# Patient Record
Sex: MALE | Race: OTHER | HISPANIC OR LATINO | URBAN - METROPOLITAN AREA
[De-identification: names, ages, dates, MRNs, and addresses within clinical notes are randomized per-mention and may not be internally consistent; named-entity substitution may affect disease eponyms.]

---

## 2019-01-10 ENCOUNTER — EMERGENCY (EMERGENCY)
Facility: HOSPITAL | Age: 34
LOS: 1 days | Discharge: HOME | End: 2019-01-10
Attending: EMERGENCY MEDICINE
Payer: MEDICAID

## 2019-01-10 VITALS
HEIGHT: 64 IN | WEIGHT: 179.9 LBS | HEART RATE: 81 BPM | OXYGEN SATURATION: 98 % | DIASTOLIC BLOOD PRESSURE: 88 MMHG | TEMPERATURE: 98 F | SYSTOLIC BLOOD PRESSURE: 165 MMHG | RESPIRATION RATE: 19 BRPM

## 2019-01-10 VITALS
OXYGEN SATURATION: 100 % | HEART RATE: 66 BPM | DIASTOLIC BLOOD PRESSURE: 77 MMHG | RESPIRATION RATE: 18 BRPM | SYSTOLIC BLOOD PRESSURE: 114 MMHG

## 2019-01-10 LAB
APPEARANCE UR: CLEAR — SIGNIFICANT CHANGE UP
BACTERIA # UR AUTO: NEGATIVE — SIGNIFICANT CHANGE UP
BILIRUB UR-MCNC: NEGATIVE — SIGNIFICANT CHANGE UP
COLOR SPEC: SIGNIFICANT CHANGE UP
DIFF PNL FLD: NEGATIVE — SIGNIFICANT CHANGE UP
EPI CELLS # UR: 0 /HPF — SIGNIFICANT CHANGE UP
GLUCOSE UR QL: NEGATIVE — SIGNIFICANT CHANGE UP
HYALINE CASTS # UR AUTO: 0 /LPF — SIGNIFICANT CHANGE UP (ref 0–2)
KETONES UR-MCNC: NEGATIVE — SIGNIFICANT CHANGE UP
LEUKOCYTE ESTERASE UR-ACNC: NEGATIVE — SIGNIFICANT CHANGE UP
NITRITE UR-MCNC: NEGATIVE — SIGNIFICANT CHANGE UP
PH UR: 6 — SIGNIFICANT CHANGE UP (ref 5–8)
PROT UR-MCNC: NEGATIVE — SIGNIFICANT CHANGE UP
RBC CASTS # UR COMP ASSIST: 1 /HPF — SIGNIFICANT CHANGE UP (ref 0–4)
SP GR SPEC: 1.02 — SIGNIFICANT CHANGE UP (ref 1.01–1.02)
UROBILINOGEN FLD QL: NEGATIVE — SIGNIFICANT CHANGE UP
WBC UR QL: 0 /HPF — SIGNIFICANT CHANGE UP (ref 0–5)

## 2019-01-10 PROCEDURE — 93975 VASCULAR STUDY: CPT | Mod: 26

## 2019-01-10 PROCEDURE — 87086 URINE CULTURE/COLONY COUNT: CPT

## 2019-01-10 PROCEDURE — 76870 US EXAM SCROTUM: CPT | Mod: 26

## 2019-01-10 PROCEDURE — 93975 VASCULAR STUDY: CPT

## 2019-01-10 PROCEDURE — 99284 EMERGENCY DEPT VISIT MOD MDM: CPT

## 2019-01-10 PROCEDURE — 99284 EMERGENCY DEPT VISIT MOD MDM: CPT | Mod: 25

## 2019-01-10 PROCEDURE — 81001 URINALYSIS AUTO W/SCOPE: CPT

## 2019-01-10 PROCEDURE — 76870 US EXAM SCROTUM: CPT

## 2019-01-10 RX ORDER — ACETAMINOPHEN 500 MG
975 TABLET ORAL ONCE
Qty: 0 | Refills: 0 | Status: COMPLETED | OUTPATIENT
Start: 2019-01-10 | End: 2019-01-10

## 2019-01-10 RX ADMIN — Medication 975 MILLIGRAM(S): at 21:20

## 2019-01-10 NOTE — ED PROVIDER NOTE - ATTENDING CONTRIBUTION TO CARE
33M, no sig pmh, presetns with L-sided testicular pain, intermittent x 1 year, more consistent over last week. waxing and waning in severity. no sig provoking or alleviating factors. 8/10 today which pompted ed visit. non-radiating. no dysuria, hematuria, urgency. no penile d/c. no hx std. no rash, swelling. sexually active with one partner only. denies cp, sob, abd pain, n/v/d, change in stools. no trauma.    PE: NAD, NCAT, MMM, Trachea midline, Normal conjunctiva, lungs CTAB, S1/S2 RRR, Normal perfusion, 2+ radial pulses bilat, Abdomen Soft, NTND, No rebound/guarding, No LE edema, No deformity of extremities, No rashes,  No focal motor or sensory deficits. Testicular exam (max bailey) - minimal ttp posterior aspect L testicle, normal lie, no edema, no evidence hernia on cough, no discharge from meatus, no rash.    VS without sig abnormality. Ddx includes but not limited to epididymitis, torsion (although low suspicion), UTI. no s/s std. obtain US, urine/culture, give tylenol, re-eval. - Garret Harrell MD

## 2019-01-10 NOTE — ED PROVIDER NOTE - PROGRESS NOTE DETAILS
pt feeling somewhat improved. updated on results. to f/u with urology. return precautions discussed. An opportunity to ask questions was provided and all answered. - Garret Harrell MD

## 2019-01-10 NOTE — ED PROVIDER NOTE - OBJECTIVE STATEMENT
32 yo otherwise healthy male presents to the ED c/o left sided testicular pain x 1 week. Pt reports has had generalized testicular pain for about 1 year now but has not followed up with anyone. Pain was felt every day, intermittent in nature and not worsened with movement or activity. This past wee pt has felt pain has been more left sided and has increased in intensity. Today pain was 8/10 so came to ED. Pain intermittent throughout the day. He is sexually active with 1 partner.  Denies fever/chills, urinary urgency, dysuria, frequency, penile discharge, abdominal pain, n/v/d, recent travel, trauma to the area, Does not know if his vaccinations are up to date. 32 yo otherwise healthy male presents to the ED c/o left sided testicular pain x 1 week. Pt reports has had generalized testicular pain for about 1 year now but has not followed up with anyone. Pain was felt every day, intermittent in nature and not worsened with movement or activity. This past wee pt has felt pain has been more left sided and has increased in intensity. Today pain was 8/10 so came to ED. Pain intermittent throughout the day. He is sexually active with 1 partner.  Denies fever/chills, urinary urgency, dysuria, frequency, hematuria, penile discharge, abdominal pain, n/v/d, recent travel, trauma to the area, chest pain, shortness of breath, dizziness, weakness, flank pain. Does not know if his vaccinations are up to date.

## 2019-01-10 NOTE — ED PROVIDER NOTE - NSFOLLOWUPCLINICS_GEN_ALL_ED_FT
[facility] - Urology  Urology  [address], [address] [place] [address]  Phone: [phone]  Fax:   Follow Up Time:

## 2019-01-10 NOTE — ED ADULT NURSE NOTE - OBJECTIVE STATEMENT
32y/o male walked into ED a&ox3 c/o testicular pain. Patient reports pain for the past year, intermittent. States he saw a doctor about it when it first began but has not seen anyone since. Reports pain has been worse than usual today and decided he would come get checked out. Reports pain is worse in left testicle but sometimes feels pain in both. Nothing makes pain better or worse, unable to describe pain. Denies Abdominal pain, urinary frequency/burning/hesitancy, blood in urine, back pain, discharge from penis, swelling to testicles, or any other symptoms. Lungs clear b/l. Abd soft, nontender, nondistended. No swelling, redness or drainage noted upon assessment. No tenderness. INGRID Renee at bedside for eval.

## 2019-01-10 NOTE — ED PROVIDER NOTE - GENITOURINARY TESTICULAR EXAM, RIGHT
Normal lie, no tenderness, no mass/selling. No epididymal tenderness.  Cremasteric reflex is present

## 2019-01-10 NOTE — ED ADULT NURSE NOTE - NSIMPLEMENTINTERV_GEN_ALL_ED
Implemented All Universal Safety Interventions:  [place] to call system. Call bell, personal items and telephone within reach. Instruct patient to call for assistance. Room bathroom lighting operational. Non-slip footwear when patient is off stretcher. Physically safe environment: no spills, clutter or unnecessary equipment. Stretcher in lowest position, wheels locked, appropriate side rails in place.

## 2019-01-10 NOTE — ED PROVIDER NOTE - NSFOLLOWUPINSTRUCTIONS_ED_ALL_ED_FT
You were seen for testicular pain. Your ultrasound showed hydroceles but no other significant findings. Please follow-up with urology at number provided, call for appointment. Take Tylenol and/or Motrin as directed for pain. Return immediately for any worsening or concerning symptoms including but not limited to difficulty urinating, painful urination, worsening testicular pain, swelling or discoloration of testicles, abdominal pain, or anything else that concerns you.

## 2019-01-10 NOTE — ED PROVIDER NOTE - NONTENDER LOCATION
Nontender all abdominal quadrants with no rebound, guarding or rigidity noted. Negative Rovsing's, negative Sequeira's. No McBurney's point tenderness.

## 2019-01-11 LAB
CULTURE RESULTS: NO GROWTH — SIGNIFICANT CHANGE UP
SPECIMEN SOURCE: SIGNIFICANT CHANGE UP

## 2019-01-18 ENCOUNTER — OUTPATIENT (OUTPATIENT)
Dept: OUTPATIENT SERVICES | Facility: HOSPITAL | Age: 34
LOS: 1 days | End: 2019-01-18
Payer: SELF-PAY

## 2019-01-18 ENCOUNTER — APPOINTMENT (OUTPATIENT)
Dept: UROLOGY | Facility: CLINIC | Age: 34
End: 2019-01-18

## 2019-01-18 DIAGNOSIS — R35.0 FREQUENCY OF MICTURITION: ICD-10-CM

## 2019-01-18 PROCEDURE — G0463: CPT

## 2019-01-18 NOTE — HISTORY OF PRESENT ILLNESS
[FreeTextEntry1] : Patient is a 33yoM who presents with L. sided scrotal pain.  He presented to the [facility] ER on 1/10/19 complaining of left-sided testicular pain for ~one week duration.  Here he underwent ultrasound showing b/l hydroceles with right (small-moderate) greater than left (small), normal blood flow.  Pain has been ongoing for the last year.  He describes it as dull and has been progressively getting worse.  \par \par He has no issues urinating, he denies frequency, hematuria, dysuria.  Denies n/v, f/c, weight change, no constipation/diarrhea.  \par \par PMH: none\par PSH: none\par Allergies: none\par Medications: Vitamin C/D\par Family history: Unsure\par Socail: quit smoking 5 years ago, (10/day), social etoh use\par Sexually active, with wife. \par

## 2019-01-18 NOTE — REVIEW OF SYSTEMS
[Testicular Pain] : testicular pain [Negative] : ENT [Fever] : no fever [Chills] : no chills [Eye Pain] : no eye pain [Chest Pain] : no chest pain [Palpitations] : no palpitations [Shortness Of Breath] : no shortness of breath [Abdominal Pain] : no abdominal pain [Vomiting] : no vomiting [Constipation] : no constipation [Diarrhea] : no diarrhea [Dysuria] : no dysuria [Incontinence] : no incontinence [Hesitancy] : no urinary hesitancy [Genital Lesion] : no genital lesions [Joint Swelling] : no joint swelling [Skin Lesions] : no skin lesions [Muscle Weakness] : no muscle weakness [Swollen Glands In The Neck] : no swollen glands in the neck

## 2019-01-18 NOTE — PHYSICAL EXAM
[General Appearance - Well Developed] : well developed [Well Groomed] : well groomed [General Appearance - In No Acute Distress] : no acute distress [Edema] : no peripheral edema [] : no respiratory distress [Abdomen Soft] : soft [Abdomen Tenderness] : non-tender [Costovertebral Angle Tenderness] : no ~M costovertebral angle tenderness [Urethral Meatus] : meatus normal [Penis Abnormality] : normal uncircumcised penis [Testes Tenderness] : no tenderness of the testes [Normal Station and Gait] : the gait and station were normal for the patient's age [No Focal Deficits] : no focal deficits [Sensation] : the sensory exam was normal to light touch and pinprick [Motor Exam] : the motor exam was normal [Oriented To Time, Place, And Person] : oriented to person, place, and time [No Palpable Adenopathy] : no palpable adenopathy [FreeTextEntry1] : Mild b/l hydroceles (R>L)

## 2019-01-18 NOTE — ASSESSMENT
[FreeTextEntry1] : Patient is a 33yoM with no PMH who presents with testicular pain, [facility] ED on 1/10, underwent u/s showing BL hydroceles (small-moderate on right and small on left). \par \par - Patient with small b/l hydroceles, no large fluid collection palpated.  \par - Recommend Motrin for pain/swelling control\par - Continue to monitor, if progression of size/pain, instructed to re-contact for possible hydrocelectomy.  \par

## 2019-01-22 DIAGNOSIS — N43.3 HYDROCELE, UNSPECIFIED: ICD-10-CM

## 2019-01-23 LAB — BACTERIA UR CULT: NORMAL

## 2019-08-02 ENCOUNTER — LABORATORY RESULT (OUTPATIENT)
Age: 34
End: 2019-08-02

## 2019-08-02 ENCOUNTER — APPOINTMENT (OUTPATIENT)
Dept: INTERNAL MEDICINE | Facility: CLINIC | Age: 34
End: 2019-08-02

## 2019-08-02 ENCOUNTER — OUTPATIENT (OUTPATIENT)
Dept: OUTPATIENT SERVICES | Facility: HOSPITAL | Age: 34
LOS: 1 days | End: 2019-08-02

## 2019-08-02 VITALS
OXYGEN SATURATION: 98 % | BODY MASS INDEX: 27 KG/M2 | HEIGHT: 66.14 IN | HEART RATE: 62 BPM | WEIGHT: 168 LBS | SYSTOLIC BLOOD PRESSURE: 110 MMHG | DIASTOLIC BLOOD PRESSURE: 80 MMHG

## 2019-08-02 DIAGNOSIS — Z78.9 OTHER SPECIFIED HEALTH STATUS: ICD-10-CM

## 2019-08-02 DIAGNOSIS — Z87.891 PERSONAL HISTORY OF NICOTINE DEPENDENCE: ICD-10-CM

## 2019-08-02 DIAGNOSIS — N43.3 HYDROCELE, UNSPECIFIED: ICD-10-CM

## 2019-08-02 DIAGNOSIS — F17.210 NICOTINE DEPENDENCE, CIGARETTES, UNCOMPLICATED: ICD-10-CM

## 2019-08-02 LAB
ALBUMIN SERPL ELPH-MCNC: 4.9 G/DL — SIGNIFICANT CHANGE UP (ref 3.3–5)
ALP SERPL-CCNC: 68 U/L — SIGNIFICANT CHANGE UP (ref 40–120)
ALT FLD-CCNC: 54 U/L — HIGH (ref 4–41)
ANION GAP SERPL CALC-SCNC: 13 MMO/L — SIGNIFICANT CHANGE UP (ref 7–14)
AST SERPL-CCNC: 35 U/L — SIGNIFICANT CHANGE UP (ref 4–40)
BASOPHILS # BLD AUTO: 0.05 K/UL — SIGNIFICANT CHANGE UP (ref 0–0.2)
BASOPHILS NFR BLD AUTO: 0.7 % — SIGNIFICANT CHANGE UP (ref 0–2)
BILIRUB SERPL-MCNC: 0.3 MG/DL — SIGNIFICANT CHANGE UP (ref 0.2–1.2)
BUN SERPL-MCNC: 12 MG/DL — SIGNIFICANT CHANGE UP (ref 7–23)
CALCIUM SERPL-MCNC: 9.9 MG/DL — SIGNIFICANT CHANGE UP (ref 8.4–10.5)
CHLORIDE SERPL-SCNC: 102 MMOL/L — SIGNIFICANT CHANGE UP (ref 98–107)
CHOLEST SERPL-MCNC: 221 MG/DL — HIGH (ref 120–199)
CO2 SERPL-SCNC: 27 MMOL/L — SIGNIFICANT CHANGE UP (ref 22–31)
CREAT SERPL-MCNC: 0.74 MG/DL — SIGNIFICANT CHANGE UP (ref 0.5–1.3)
EOSINOPHIL # BLD AUTO: 0.24 K/UL — SIGNIFICANT CHANGE UP (ref 0–0.5)
EOSINOPHIL NFR BLD AUTO: 3.3 % — SIGNIFICANT CHANGE UP (ref 0–6)
GLUCOSE SERPL-MCNC: 93 MG/DL — SIGNIFICANT CHANGE UP (ref 70–99)
HBA1C BLD-MCNC: 5.5 % — SIGNIFICANT CHANGE UP (ref 4–5.6)
HCT VFR BLD CALC: 47.1 % — SIGNIFICANT CHANGE UP (ref 39–50)
HDLC SERPL-MCNC: 28 MG/DL — LOW (ref 35–55)
HGB BLD-MCNC: 15.4 G/DL — SIGNIFICANT CHANGE UP (ref 13–17)
IMM GRANULOCYTES NFR BLD AUTO: 0.3 % — SIGNIFICANT CHANGE UP (ref 0–1.5)
LIPID PNL WITH DIRECT LDL SERPL: 172 MG/DL — SIGNIFICANT CHANGE UP
LYMPHOCYTES # BLD AUTO: 3.38 K/UL — HIGH (ref 1–3.3)
LYMPHOCYTES # BLD AUTO: 46.4 % — HIGH (ref 13–44)
MCHC RBC-ENTMCNC: 29.8 PG — SIGNIFICANT CHANGE UP (ref 27–34)
MCHC RBC-ENTMCNC: 32.7 % — SIGNIFICANT CHANGE UP (ref 32–36)
MCV RBC AUTO: 91.1 FL — SIGNIFICANT CHANGE UP (ref 80–100)
MONOCYTES # BLD AUTO: 0.6 K/UL — SIGNIFICANT CHANGE UP (ref 0–0.9)
MONOCYTES NFR BLD AUTO: 8.2 % — SIGNIFICANT CHANGE UP (ref 2–14)
NEUTROPHILS # BLD AUTO: 2.99 K/UL — SIGNIFICANT CHANGE UP (ref 1.8–7.4)
NEUTROPHILS NFR BLD AUTO: 41.1 % — LOW (ref 43–77)
NRBC # FLD: 0 K/UL — SIGNIFICANT CHANGE UP (ref 0–0)
PLATELET # BLD AUTO: 329 K/UL — SIGNIFICANT CHANGE UP (ref 150–400)
PMV BLD: 10 FL — SIGNIFICANT CHANGE UP (ref 7–13)
POTASSIUM SERPL-MCNC: 4.5 MMOL/L — SIGNIFICANT CHANGE UP (ref 3.5–5.3)
POTASSIUM SERPL-SCNC: 4.5 MMOL/L — SIGNIFICANT CHANGE UP (ref 3.5–5.3)
PROT SERPL-MCNC: 7.9 G/DL — SIGNIFICANT CHANGE UP (ref 6–8.3)
RBC # BLD: 5.17 M/UL — SIGNIFICANT CHANGE UP (ref 4.2–5.8)
RBC # FLD: 12.3 % — SIGNIFICANT CHANGE UP (ref 10.3–14.5)
SODIUM SERPL-SCNC: 142 MMOL/L — SIGNIFICANT CHANGE UP (ref 135–145)
TRIGL SERPL-MCNC: 182 MG/DL — HIGH (ref 10–149)
WBC # BLD: 7.28 K/UL — SIGNIFICANT CHANGE UP (ref 3.8–10.5)
WBC # FLD AUTO: 7.28 K/UL — SIGNIFICANT CHANGE UP (ref 3.8–10.5)

## 2019-08-02 NOTE — HEALTH RISK ASSESSMENT
[Yes] : Yes [] : Yes [2 - 3 times a week (3 pts)] : 2 - 3  times a week (3 points) [Never (0 pts)] : Never (0 points) [3 or 4 (1 pt)] : 3 or 4  (1 point) [No] : In the past 12 months have you used drugs other than those required for medical reasons? No [No falls in past year] : Patient reported no falls in the past year [0] : 2) Feeling down, depressed, or hopeless: Not at all (0) [Cultural] : cultural [With Family] : lives with family [Financial] : financial [# of Members in Household ___] :  household currently consist of [unfilled] member(s) [Employed] : employed [Sexually Active] : sexually active [Fully functional (bathing, dressing, toileting, transferring, walking, feeding)] : Fully functional (bathing, dressing, toileting, transferring, walking, feeding) [Feels Safe at Home] : Feels safe at home [Fully functional (using the telephone, shopping, preparing meals, housekeeping, doing laundry, using] : Fully functional and needs no help or supervision to perform IADLs (using the telephone, shopping, preparing meals, housekeeping, doing laundry, using transportation, managing medications and managing finances) [Health Literacy] : health literacy [# Of Children ___] : has [unfilled] children [] :  [HIV Test offered] : HIV Test offered [Hepatitis C test offered] : Hepatitis C test offered [FreeTextEntry1] : Testicular pain  [de-identified] : Seen by urology 1/2019 [de-identified] : Occasionally. Sometimes will smoke 1-2 cigarettes once per week. Previously smoked 10 cigarettes/day ~3 years ago. [de-identified] : Occasional [Audit-CScore] : 4 [de-identified] : Sometimes plays soccer three times a week [de-identified] : Regular diet that includes rice, tortillas, arepas, empanadas, bread, chicken, steak, fish. Very rarely eats vegetables, salads, or fruits.  [[id number]] : 0 [High Risk Behavior] : no high risk behavior [Reports changes in vision] : Reports no changes in vision [Reports changes in hearing] : Reports no changes in hearing [Reports changes in dental health] : Reports no changes in dental health [de-identified] : Language [FreeTextEntry2] : Construction

## 2019-08-02 NOTE — REVIEW OF SYSTEMS
[Fever] : no fever [Chills] : no chills [Fatigue] : no fatigue [Night Sweats] : no night sweats [Recent Change In Weight] : ~T no recent weight change [Vision Problems] : no vision problems [Chest Pain] : no chest pain [Lower Ext Edema] : no lower extremity edema [Shortness Of Breath] : no shortness of breath [Wheezing] : no wheezing [Cough] : no cough [Dyspnea on Exertion] : not dyspnea on exertion [Abdominal Pain] : no abdominal pain [Nausea] : no nausea [Constipation] : no constipation [Diarrhea] : no diarrhea [Vomiting] : no vomiting [Heartburn] : no heartburn [Melena] : no melena [Incontinence] : no incontinence [Hematuria] : no hematuria [Skin Rash] : no skin rash [Frequency] : no frequency [Depression] : no depression [FreeTextEntry8] : Dysuria. Bilateral testicular pain

## 2019-08-02 NOTE — HISTORY OF PRESENT ILLNESS
[FreeTextEntry1] : Establish care [de-identified] : This patient is primarily Hebrew speaking and this author is a native speaker. \par \par 33 y.o Garcia BENITEZ with bilateral hydrocele presenting today to establish care. Last seen by a PCP 1-2 years ago. States feeling well today. Continues with daily, intermittent testicular pain x >1 year. Describes the pain as soreness and dullness and worsening since last seen by urology 8 months ago. Attempted taking Motrin x 4-5 days initially in January with no relief. Admits to dysuria one or twice in the last 2 months.  Denies trauma, testicular swelling/increase in size, blood in urine, urinary frequency, flank pain, new sexual partners, fever, chills, nausea, vomiting, diarrhea, unintentional weight loss, or recent travel.\par \par Of note, pt's wife recently underwent bilateral mastectomy for breast ca. Wife recovering well and discharged today. To pick her-up after today's visit. Though expresses undergoing a difficult time, denies depression.

## 2019-08-02 NOTE — PHYSICAL EXAM
[No Acute Distress] : no acute distress [Well Nourished] : well nourished [Well Developed] : well developed [No JVD] : no jugular venous distention [Well-Appearing] : well-appearing [Supple] : supple [No Lymphadenopathy] : no lymphadenopathy [No Respiratory Distress] : no respiratory distress  [Thyroid Normal, No Nodules] : the thyroid was normal and there were no nodules present [No Accessory Muscle Use] : no accessory muscle use [Clear to Auscultation] : lungs were clear to auscultation bilaterally [Regular Rhythm] : with a regular rhythm [Normal Rate] : normal rate  [Pedal Pulses Present] : the pedal pulses are present [No Murmur] : no murmur heard [Normal S1, S2] : normal S1 and S2 [No Edema] : there was no peripheral edema [Penis Abnormality] : normal uncircumcised penis [Testes Tenderness] : no tenderness of the testes [No CVA Tenderness] : no CVA  tenderness [Testes Mass (___cm)] : there were no testicular masses [No Rash] : no rash [Coordination Grossly Intact] : coordination grossly intact [Normal Gait] : normal gait [No Focal Deficits] : no focal deficits [Alert and Oriented x3] : oriented to person, place, and time [Normal Affect] : the affect was normal [Normal Insight/Judgement] : insight and judgment were intact [de-identified] : Chaperone encouraged for  exam but pt declines. bilateral hydroceles noted.

## 2019-08-03 LAB
HIV 1+2 AB+HIV1 P24 AG SERPL QL IA: SIGNIFICANT CHANGE UP
MEV IGG SER-ACNC: >300 AU/ML — SIGNIFICANT CHANGE UP
MEV IGG+IGM SER-IMP: POSITIVE — SIGNIFICANT CHANGE UP
MUV AB SER-ACNC: POSITIVE — SIGNIFICANT CHANGE UP
MUV IGG FLD-ACNC: 110 AU/ML — SIGNIFICANT CHANGE UP
RUBV IGG SER-ACNC: 30.6 INDEX — SIGNIFICANT CHANGE UP
RUBV IGG SER-IMP: POSITIVE — SIGNIFICANT CHANGE UP
T PALLIDUM AB TITR SER: NEGATIVE — SIGNIFICANT CHANGE UP

## 2019-08-04 LAB
HBV CORE AB SER-ACNC: NONREACTIVE — SIGNIFICANT CHANGE UP
HBV SURFACE AB SER-ACNC: NONREACTIVE — SIGNIFICANT CHANGE UP
HBV SURFACE AG SER-ACNC: NONREACTIVE — SIGNIFICANT CHANGE UP
HCV AB S/CO SERPL IA: 0.08 S/CO — SIGNIFICANT CHANGE UP (ref 0–0.99)
HCV AB SERPL-IMP: SIGNIFICANT CHANGE UP

## 2019-08-05 LAB
C TRACH RRNA SPEC QL NAA+PROBE: SIGNIFICANT CHANGE UP
N GONORRHOEA RRNA SPEC QL NAA+PROBE: SIGNIFICANT CHANGE UP
SPECIMEN SOURCE: SIGNIFICANT CHANGE UP

## 2019-10-04 ENCOUNTER — OUTPATIENT (OUTPATIENT)
Dept: OUTPATIENT SERVICES | Facility: HOSPITAL | Age: 34
LOS: 1 days | End: 2019-10-04
Payer: SELF-PAY

## 2019-10-04 ENCOUNTER — APPOINTMENT (OUTPATIENT)
Dept: UROLOGY | Facility: CLINIC | Age: 34
End: 2019-10-04

## 2019-10-04 DIAGNOSIS — R35.0 FREQUENCY OF MICTURITION: ICD-10-CM

## 2019-10-04 PROCEDURE — G0463: CPT

## 2019-10-04 NOTE — PHYSICAL EXAM
[Well Groomed] : well groomed [General Appearance - Well Developed] : well developed [General Appearance - In No Acute Distress] : no acute distress [Abdomen Soft] : soft [Abdomen Tenderness] : non-tender [Costovertebral Angle Tenderness] : no ~M costovertebral angle tenderness [Urethral Meatus] : meatus normal [Penis Abnormality] : normal uncircumcised penis [Testes Tenderness] : no tenderness of the testes [Edema] : no peripheral edema [] : no respiratory distress [Oriented To Time, Place, And Person] : oriented to person, place, and time [Normal Station and Gait] : the gait and station were normal for the patient's age [No Focal Deficits] : no focal deficits [Motor Exam] : the motor exam was normal [Sensation] : the sensory exam was normal to light touch and pinprick [Scrotum] : the scrotum was normal [Skin Color & Pigmentation] : normal skin color and pigmentation [FreeTextEntry1] : No palpable hydroceles.  No testicular tenderness.  Scrotum appears normal.  Testicular ultrasound performed with BK ultrasound probe showing no residual hydroceles, normal testicular size and volume

## 2019-10-04 NOTE — ASSESSMENT
[FreeTextEntry1] : 33 y/o M with no PMH who presents with testicular pain.  Had previous bilateral hydroceles.  No palpable or visible  hydroceles.  No scrotal or testicular tenderness currently.\par \par - Formal testicular sono, will call with results\par - Recommend tylenol and motrin as needed for pain control\par - Elevated scrotum\par - RTC as needed\par

## 2019-10-04 NOTE — REVIEW OF SYSTEMS
[Testicular Pain] : testicular pain [see HPI] : see HPI [Negative] : Gastrointestinal [Fever] : no fever [Eye Pain] : no eye pain [Chills] : no chills [Chest Pain] : no chest pain [Palpitations] : no palpitations [Shortness Of Breath] : no shortness of breath [Abdominal Pain] : no abdominal pain [Vomiting] : no vomiting [Constipation] : no constipation [Diarrhea] : no diarrhea [Incontinence] : no incontinence [Dysuria] : no dysuria [Genital Lesion] : no genital lesions [Hesitancy] : no urinary hesitancy [Joint Swelling] : no joint swelling [Skin Lesions] : no skin lesions [Muscle Weakness] : no muscle weakness [Swollen Glands In The Neck] : no swollen glands in the neck

## 2019-10-04 NOTE — HISTORY OF PRESENT ILLNESS
[FreeTextEntry1] : 35 y/o M with bilateral hydroceles in January 2019 presents for continued testicular pain.  Pain is intermittent and bilateral.  Pain is as high as a 9/10.  Comes out of no where.  Exacerbated by lifting heavy objects.  No relief with conservative management but has not really tried PO pain control.  \par \par PMH: none\par PSH: none\par Allergies: none\par Medications: Vitamin C/D\par Family history: Unsure\par Socail: quit smoking 5 years ago, (10/day), social etoh use\par Sexually active, with wife. \par  [Urinary Incontinence] : no urinary incontinence [Urinary Retention] : no urinary retention [Urinary Urgency] : no urinary urgency [Urinary Frequency] : no urinary frequency [Dysuria] : no dysuria [Nocturia] : no nocturia [Hematuria - Gross] : no gross hematuria [Fatigue] : no fatigue [Fever] : no fever [Nausea] : no nausea [Anorexia] : no anorexia

## 2019-10-07 DIAGNOSIS — N50.819 TESTICULAR PAIN, UNSPECIFIED: ICD-10-CM

## 2019-10-07 DIAGNOSIS — N43.3 HYDROCELE, UNSPECIFIED: ICD-10-CM

## 2020-02-18 ENCOUNTER — APPOINTMENT (OUTPATIENT)
Dept: INTERNAL MEDICINE | Facility: CLINIC | Age: 35
End: 2020-02-18

## 2020-12-04 ENCOUNTER — LABORATORY RESULT (OUTPATIENT)
Age: 35
End: 2020-12-04

## 2020-12-04 ENCOUNTER — APPOINTMENT (OUTPATIENT)
Dept: INTERNAL MEDICINE | Facility: CLINIC | Age: 35
End: 2020-12-04

## 2020-12-04 ENCOUNTER — OUTPATIENT (OUTPATIENT)
Dept: OUTPATIENT SERVICES | Facility: HOSPITAL | Age: 35
LOS: 1 days | End: 2020-12-04

## 2020-12-04 VITALS
DIASTOLIC BLOOD PRESSURE: 80 MMHG | WEIGHT: 185 LBS | HEART RATE: 68 BPM | SYSTOLIC BLOOD PRESSURE: 115 MMHG | OXYGEN SATURATION: 98 % | BODY MASS INDEX: 29.73 KG/M2 | HEIGHT: 66 IN

## 2020-12-04 DIAGNOSIS — E78.5 HYPERLIPIDEMIA, UNSPECIFIED: ICD-10-CM
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
## 2020-12-04 DIAGNOSIS — Z11.3 ENCOUNTER FOR SCREENING FOR INFECTIONS WITH A PREDOMINANTLY SEXUAL MODE OF TRANSMISSION: ICD-10-CM

## 2020-12-04 DIAGNOSIS — Z00.00 ENCOUNTER FOR GENERAL ADULT MEDICAL EXAMINATION WITHOUT ABNORMAL FINDINGS: ICD-10-CM

## 2020-12-04 DIAGNOSIS — Z23 ENCOUNTER FOR IMMUNIZATION: ICD-10-CM

## 2020-12-04 DIAGNOSIS — N50.819 TESTICULAR PAIN, UNSPECIFIED: ICD-10-CM

## 2020-12-04 DIAGNOSIS — M25.562 PAIN IN LEFT KNEE: ICD-10-CM

## 2020-12-04 DIAGNOSIS — Z87.898 PERSONAL HISTORY OF OTHER SPECIFIED CONDITIONS: ICD-10-CM

## 2020-12-04 DIAGNOSIS — Z00.00 ENCOUNTER FOR GENERAL ADULT MEDICAL EXAMINATION W/OUT ABNORMAL FINDINGS: ICD-10-CM
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
## 2020-12-04 DIAGNOSIS — E66.3 OVERWEIGHT: ICD-10-CM

## 2020-12-04 LAB
ALBUMIN SERPL ELPH-MCNC: 4.9 G/DL — SIGNIFICANT CHANGE UP (ref 3.3–5)
ALP SERPL-CCNC: 69 U/L — SIGNIFICANT CHANGE UP (ref 40–120)
ALT FLD-CCNC: 24 U/L — SIGNIFICANT CHANGE UP (ref 4–41)
ANION GAP SERPL CALC-SCNC: 11 MMO/L — SIGNIFICANT CHANGE UP (ref 7–14)
AST SERPL-CCNC: 19 U/L — SIGNIFICANT CHANGE UP (ref 4–40)
BASOPHILS # BLD AUTO: 0.08 K/UL — SIGNIFICANT CHANGE UP (ref 0–0.2)
BASOPHILS NFR BLD AUTO: 1.1 % — SIGNIFICANT CHANGE UP (ref 0–2)
BILIRUB SERPL-MCNC: 0.3 MG/DL — SIGNIFICANT CHANGE UP (ref 0.2–1.2)
BUN SERPL-MCNC: 13 MG/DL — SIGNIFICANT CHANGE UP (ref 7–23)
CALCIUM SERPL-MCNC: 9.6 MG/DL — SIGNIFICANT CHANGE UP (ref 8.4–10.5)
CHLORIDE SERPL-SCNC: 100 MMOL/L — SIGNIFICANT CHANGE UP (ref 98–107)
CHOLEST SERPL-MCNC: 219 MG/DL — HIGH (ref 120–199)
CO2 SERPL-SCNC: 28 MMOL/L — SIGNIFICANT CHANGE UP (ref 22–31)
CREAT SERPL-MCNC: 0.69 MG/DL — SIGNIFICANT CHANGE UP (ref 0.5–1.3)
DIRECT LDL: 176 MG/DL — SIGNIFICANT CHANGE UP
EOSINOPHIL # BLD AUTO: 0.49 K/UL — SIGNIFICANT CHANGE UP (ref 0–0.5)
EOSINOPHIL NFR BLD AUTO: 6.5 % — HIGH (ref 0–6)
GLUCOSE SERPL-MCNC: 93 MG/DL — SIGNIFICANT CHANGE UP (ref 70–99)
HBA1C BLD-MCNC: 5.7 % — HIGH (ref 4–5.6)
HBV SURFACE AG SER-ACNC: NEGATIVE — SIGNIFICANT CHANGE UP
HCT VFR BLD CALC: 48.1 % — SIGNIFICANT CHANGE UP (ref 39–50)
HDLC SERPL-MCNC: 31 MG/DL — LOW (ref 35–55)
HGB BLD-MCNC: 15.9 G/DL — SIGNIFICANT CHANGE UP (ref 13–17)
IMM GRANULOCYTES NFR BLD AUTO: 0.7 % — SIGNIFICANT CHANGE UP (ref 0–1.5)
LYMPHOCYTES # BLD AUTO: 3.43 K/UL — HIGH (ref 1–3.3)
LYMPHOCYTES # BLD AUTO: 45.2 % — HIGH (ref 13–44)
MCHC RBC-ENTMCNC: 29.8 PG — SIGNIFICANT CHANGE UP (ref 27–34)
MCHC RBC-ENTMCNC: 33.1 % — SIGNIFICANT CHANGE UP (ref 32–36)
MCV RBC AUTO: 90.2 FL — SIGNIFICANT CHANGE UP (ref 80–100)
MONOCYTES # BLD AUTO: 0.6 K/UL — SIGNIFICANT CHANGE UP (ref 0–0.9)
MONOCYTES NFR BLD AUTO: 7.9 % — SIGNIFICANT CHANGE UP (ref 2–14)
NEUTROPHILS # BLD AUTO: 2.94 K/UL — SIGNIFICANT CHANGE UP (ref 1.8–7.4)
NEUTROPHILS NFR BLD AUTO: 38.6 % — LOW (ref 43–77)
NRBC # FLD: 0 K/UL — SIGNIFICANT CHANGE UP (ref 0–0)
PLATELET # BLD AUTO: 384 K/UL — SIGNIFICANT CHANGE UP (ref 150–400)
PMV BLD: 9.9 FL — SIGNIFICANT CHANGE UP (ref 7–13)
POTASSIUM SERPL-MCNC: 4.4 MMOL/L — SIGNIFICANT CHANGE UP (ref 3.5–5.3)
POTASSIUM SERPL-SCNC: 4.4 MMOL/L — SIGNIFICANT CHANGE UP (ref 3.5–5.3)
PROT SERPL-MCNC: 8.1 G/DL — SIGNIFICANT CHANGE UP (ref 6–8.3)
RBC # BLD: 5.33 M/UL — SIGNIFICANT CHANGE UP (ref 4.2–5.8)
RBC # FLD: 12.5 % — SIGNIFICANT CHANGE UP (ref 10.3–14.5)
SODIUM SERPL-SCNC: 139 MMOL/L — SIGNIFICANT CHANGE UP (ref 135–145)
T4 FREE SERPL-MCNC: 1.26 NG/DL — SIGNIFICANT CHANGE UP (ref 0.9–1.8)
TRIGL SERPL-MCNC: 166 MG/DL — HIGH (ref 10–149)
TSH SERPL-MCNC: 1.12 UIU/ML — SIGNIFICANT CHANGE UP (ref 0.27–4.2)
WBC # BLD: 7.59 K/UL — SIGNIFICANT CHANGE UP (ref 3.8–10.5)
WBC # FLD AUTO: 7.59 K/UL — SIGNIFICANT CHANGE UP (ref 3.8–10.5)

## 2020-12-04 RX ORDER — IBUPROFEN 400 MG/1
400 TABLET, FILM COATED ORAL
Qty: 30 | Refills: 0 | Status: DISCONTINUED | COMMUNITY
Start: 2019-08-02 | End: 2020-12-04

## 2020-12-04 NOTE — COUNSELING
[Risk of tobacco use and health benefits of smoking cessation discussed] : Risk of tobacco use and health benefits of smoking cessation discussed [Encouraged to pick a quit date and identify support needed to quit] : Encouraged to pick a quit date and identify support needed to quit

## 2020-12-04 NOTE — PHYSICAL EXAM
[No Acute Distress] : no acute distress [Well Nourished] : well nourished [Well Developed] : well developed [Well-Appearing] : well-appearing [Normal Sclera/Conjunctiva] : normal sclera/conjunctiva [PERRL] : pupils equal round and reactive to light [Normal Outer Ear/Nose] : the outer ears and nose were normal in appearance [Normal Oropharynx] : the oropharynx was normal [No Respiratory Distress] : no respiratory distress  [No Accessory Muscle Use] : no accessory muscle use [Clear to Auscultation] : lungs were clear to auscultation bilaterally [Pedal Pulses Present] : the pedal pulses are present [No Edema] : there was no peripheral edema [Soft] : abdomen soft [Non Tender] : non-tender [Non-distended] : non-distended [No Masses] : no abdominal mass palpated [Normal Bowel Sounds] : normal bowel sounds [Coordination Grossly Intact] : coordination grossly intact [No Focal Deficits] : no focal deficits [Normal Gait] : normal gait [Normal Affect] : the affect was normal [Alert and Oriented x3] : oriented to person, place, and time [Normal Insight/Judgement] : insight and judgment were intact [de-identified] : Bilateral knee symmetric on inspection; left Ron test positive, knee tenderness elicited when valgum stress applied, negative drawer test

## 2020-12-04 NOTE — ASSESSMENT
[FreeTextEntry1] : HCM-\par -Flu vaccine today\par -Tdap last 8/2019 \par -Colorectal ca screen: No personal or family hx of colorectal ca, at average risk. Begin screen age 45\par -STI screen offered, will obtain today. In a monogamous relationship with wife\par -Depression screen negative\par -RTC annually or sooner prn \par \par *Assessment and Plan as noted above*

## 2020-12-04 NOTE — REVIEW OF SYSTEMS
[Recent Change In Weight] : ~T recent weight change [Joint Pain] : joint pain [Joint Swelling] : joint swelling [Fever] : no fever [Chills] : no chills [Night Sweats] : no night sweats [Vision Problems] : no vision problems [Chest Pain] : no chest pain [Palpitations] : no palpitations [Lower Ext Edema] : no lower extremity edema [Shortness Of Breath] : no shortness of breath [Wheezing] : no wheezing [Cough] : no cough [Dyspnea on Exertion] : not dyspnea on exertion [Abdominal Pain] : no abdominal pain [Nausea] : no nausea [Constipation] : no constipation [Diarrhea] : no diarrhea [Vomiting] : no vomiting [Dysuria] : no dysuria [Hematuria] : no hematuria [Headache] : no headache [Dizziness] : no dizziness [Fainting] : no fainting [Depression] : no depression [FreeTextEntry2] : tiredness, weight gain  [FreeTextEntry9] : knee pain

## 2020-12-04 NOTE — HISTORY OF PRESENT ILLNESS
[FreeTextEntry1] : CPE [de-identified] : Pt is primarily Mongolian speaking and this author is a native speaker. Pt declines free interpretation services and would prefer this author to continue in native language.\par \par 35 y.o  M with hx mildly elevated ALT (8/2019), hx bilateral hydroceles (2019), hx testicular pain, overweight, presenting today for an annual physical exam. Pt with c.o left knee pain x 6 months. Describes pain as dull and localized to both sides of knee and below knee. Pain is associated with "cracking" and some swelling. Reports sustaining a fall at work 6 months ago and landing on left knee. Reports feeling a "cracking" sensation with fall. Did not seek care until now. Initially, left knee with swelling but swelling has improved. Has attempted rest and rubbing knee with some improvement. States being unable to do certain movements; has been unable to run, jump, play soccer, bend down. Denies weakness, numbness/tingling, CP, SOB, CROOK, palpitations, lightheadedness, fever, chills, nausea, vomiting, diarrhea, dysuria, gross hematuria, unintentional weight loss, or recent travel.

## 2020-12-04 NOTE — HEALTH RISK ASSESSMENT
[] : Yes [No] : In the past 12 months have you used drugs other than those required for medical reasons? No [One fall no injury in past year] : Patient reported one fall in the past year without injury [0] : 2) Feeling down, depressed, or hopeless: Not at all (0) [With Family] : lives with family [Employed] : employed [] :  [Sexually Active] : sexually active [Fully functional (bathing, dressing, toileting, transferring, walking, feeding)] : Fully functional (bathing, dressing, toileting, transferring, walking, feeding) [Fully functional (using the telephone, shopping, preparing meals, housekeeping, doing laundry, using] : Fully functional and needs no help or supervision to perform IADLs (using the telephone, shopping, preparing meals, housekeeping, doing laundry, using transportation, managing medications and managing finances) [HIV Test offered] : HIV Test offered [Hepatitis C test offered] : Hepatitis C test offered [Cultural] : cultural [Health Literacy] : health literacy [Financial] : financial [Feels Safe at Home] : Feels safe at home [FreeTextEntry1] : Left knee pain  [de-identified] : Occasionally smokes 2 cigarettes a week.  [de-identified] : Previously exercising but d/t left knee pain has not been unable to go running or play soccer [de-identified] : Diet consists of bower/egg/cheese sandwiches, bread, egg, beans, cheese, fast food, juices, Persian foods. 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

## 2020-12-05 LAB
C TRACH RRNA SPEC QL NAA+PROBE: SIGNIFICANT CHANGE UP
HBV CORE AB SER-ACNC: NONREACTIVE — SIGNIFICANT CHANGE UP
HBV SURFACE AB SER-ACNC: NONREACTIVE — SIGNIFICANT CHANGE UP
HCV AB S/CO SERPL IA: 0.07 S/CO — SIGNIFICANT CHANGE UP (ref 0–0.99)
HCV AB SERPL-IMP: SIGNIFICANT CHANGE UP
HIV 1+2 AB+HIV1 P24 AG SERPL QL IA: SIGNIFICANT CHANGE UP
N GONORRHOEA RRNA SPEC QL NAA+PROBE: SIGNIFICANT CHANGE UP
SPECIMEN SOURCE: SIGNIFICANT CHANGE UP
T PALLIDUM AB TITR SER: NEGATIVE — SIGNIFICANT CHANGE UP

## 2020-12-13 ENCOUNTER — RESULT REVIEW (OUTPATIENT)
Age: 35
End: 2020-12-13

## 2020-12-13 ENCOUNTER — APPOINTMENT (OUTPATIENT)
Dept: MRI IMAGING | Facility: CLINIC | Age: 35
End: 2020-12-13

## 2020-12-13 ENCOUNTER — OUTPATIENT (OUTPATIENT)
Dept: OUTPATIENT SERVICES | Facility: HOSPITAL | Age: 35
LOS: 1 days | End: 2020-12-13
Payer: SELF-PAY

## 2020-12-13 DIAGNOSIS — M25.562 PAIN IN LEFT KNEE: ICD-10-CM

## 2020-12-13 PROCEDURE — 73721 MRI JNT OF LWR EXTRE W/O DYE: CPT | Mod: 26,LT

## 2020-12-13 PROCEDURE — 73721 MRI JNT OF LWR EXTRE W/O DYE: CPT

## 2021-01-13 ENCOUNTER — OUTPATIENT (OUTPATIENT)
Dept: OUTPATIENT SERVICES | Facility: HOSPITAL | Age: 36
LOS: 1 days | End: 2021-01-13
Payer: COMMERCIAL

## 2021-01-13 ENCOUNTER — APPOINTMENT (OUTPATIENT)
Dept: ORTHOPEDIC SURGERY | Facility: HOSPITAL | Age: 36
End: 2021-01-13

## 2021-01-13 ENCOUNTER — RESULT REVIEW (OUTPATIENT)
Age: 36
End: 2021-01-13

## 2021-01-13 ENCOUNTER — APPOINTMENT (OUTPATIENT)
Dept: RADIOLOGY | Facility: HOSPITAL | Age: 36
End: 2021-01-13

## 2021-01-13 VITALS
WEIGHT: 182 LBS | BODY MASS INDEX: 30.32 KG/M2 | DIASTOLIC BLOOD PRESSURE: 73 MMHG | HEIGHT: 65 IN | TEMPERATURE: 98.2 F | SYSTOLIC BLOOD PRESSURE: 121 MMHG

## 2021-01-13 DIAGNOSIS — S83.512S SPRAIN OF ANTERIOR CRUCIATE LIGAMENT OF LEFT KNEE, SEQUELA: ICD-10-CM

## 2021-01-13 PROCEDURE — 73564 X-RAY EXAM KNEE 4 OR MORE: CPT | Mod: 26,LT

## 2021-01-26 DIAGNOSIS — S83.512S SPRAIN OF ANTERIOR CRUCIATE LIGAMENT OF LEFT KNEE, SEQUELA: ICD-10-CM

## 2021-02-24 ENCOUNTER — APPOINTMENT (OUTPATIENT)
Dept: ORTHOPEDIC SURGERY | Facility: HOSPITAL | Age: 36
End: 2021-02-24

## 2021-02-24 VITALS
HEIGHT: 65 IN | SYSTOLIC BLOOD PRESSURE: 123 MMHG | WEIGHT: 188 LBS | TEMPERATURE: 98.2 F | BODY MASS INDEX: 31.32 KG/M2 | HEART RATE: 90 BPM | DIASTOLIC BLOOD PRESSURE: 74 MMHG

## 2021-03-02 ENCOUNTER — APPOINTMENT (OUTPATIENT)
Dept: ORTHOPEDIC SURGERY | Facility: CLINIC | Age: 36
End: 2021-03-02
Payer: SELF-PAY

## 2021-03-02 VITALS — HEART RATE: 88 BPM | DIASTOLIC BLOOD PRESSURE: 80 MMHG | SYSTOLIC BLOOD PRESSURE: 122 MMHG

## 2021-03-02 PROCEDURE — 99203 OFFICE O/P NEW LOW 30 MIN: CPT

## 2021-03-02 NOTE — PHYSICAL EXAM
[de-identified] : Oriented to time, place, person\par Mood: Normal\par Affect: Normal\par Appearance: Healthy, well appearing, no acute distress.\par Gait: Normal\par Assistive Devices: None\par \par Left Knee Exam:\par \par Skin: Clean, dry, intact\par Inspection: No obvious malalignment, no masses, no swelling, no effusion\par Pulses: 2+ DP/PT pulses \par ROM: 0-140 degrees of flexion. No pain with deep knee flexion/extension.\par Tenderness: No MJLT. No LJLT. No pain over the patella facets. No pain to the quadriceps tendon. No pain to the patella tendon. No posterior knee tenderness.\par Stability: Stable to varus, valgus. IIB Lachman testing. + anterior drawer, negative posterior drawer.\par Strength: 5/5 Q/H/TA/GS/EHL, without atrophy\par Neuro: Intact to light touch throughout, DTRs normal\par Additional Tests: + Ron's test, Negative patellar grind test \par \par Right Knee Exam:\par \par Skin: Clean, dry, intact\par Inspection: No obvious malalignment, no masses, no swelling, no effusion\par Pulses: 2+ DP/PT pulses \par ROM: 0-135 degrees of flexion. No pain with deep knee flexion/extension.\par Tenderness: No MJLT. No LJLT. No pain over the patella facets. No pain to the quadriceps tendon. No pain to the patella tendon. No posterior knee tenderness.\par Stability: Stable to varus, valgus. IIB Lachman testing. + anterior drawer, negative posterior drawer.\par Strength: 5/5 Q/H/TA/GS/EHL, without atrophy\par Neuro: Intact to light touch throughout, DTRs normal\par Additional Tests: Negative Ron's test, Negative patellar grind test  [de-identified] : Images were reviewed from [facility] dated 1.13.2021\par \par 4 views of the left knee were obtained today, 1.13.2021 that show no acute fracture or dislocation. There is mild medial, mild lateral and no patellofemoral degenerative changes seen. There is no significant malalignment. No significant other obvious osseous abnormality, otherwise unremarkable. \par \par MRI of left knee dated 12.16.2020 shows moderate partial tearing of the lateral meniscus posterior horn with oblique partial radial tearing of the anterior horn.  Degenerative medial meniscus tear.  High-grade chronic tear anterior cruciate ligament.  Chondral injury lateral femoral condyle associated with grade II/III chondromalacia.

## 2021-03-02 NOTE — REASON FOR VISIT
[Initial Visit] : an initial visit for [ Service] : provided by  Service [FreeTextEntry2] : Left knee pain

## 2021-03-02 NOTE — ADDENDUM
[FreeTextEntry1] : This note was written by Saadia Benjamin on 03/02/2021 acting solely as a scribe for Dr. Nolan De La Rosa.\par \par All medical record entries made by the Scribe were at my, Dr. Nolan De La Rosa, direction and personally dictated by me on 03/02/2021. I have personally reviewed the chart and agree that the record accurately reflects my personal performance of the history, physical exam, assessment and plan.

## 2021-03-02 NOTE — HISTORY OF PRESENT ILLNESS
[de-identified] : 36 y/o male  presents today with 1 year of left knee pain. He reports that 1 year ago he was moving a heaving object up a ramp at work when it slide and knocked his LLE off the ramp causing him to land awkwardly off the side of the ramp. He states that he landed hard and his knee twisted. The pain is constant worse with running, jumping, and sometimes at rest at the end of the day. Tylenol provides him relief. MRI obtained by his PMD revealed a partial thickness ACL tear, a lateral meniscus tear of the anterior horn, and a large osteochondral defect of the lateral condyle. He has not tried PT. patient was referred from the orthopedic clinic for consideration of surgical intervention.\par \par The patient's past medical history, past surgical history, medications and allergies were reviewed by me today with the patient and documented accordingly. In addition, the patient's family and social history, which were noncontributory to this visit, were reviewed also.

## 2021-03-02 NOTE — DISCUSSION/SUMMARY
[de-identified] : 36 y/o male left knee ACL tear, lateral meniscus tear, chondral defect lateral femoral condyle; right knee ACL deficiency\par \par Patient referred to my care for left knee instability and pain.  The patient's MRI was reviewed in detail and shows a high-grade chronic anterior cruciate ligament with no evidence of remnant fibers.  Patient also has degenerative meniscal pathology as well as a chondral injury to the lateral femoral condyle consistent with a pivoting injury.  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  Patient would be interested in surgical correction of his instability to both knees.\par \par All risks, benefits and alternatives to left anterior cruciate ligament reconstruction with evaluation of the meniscal and chondral surfaces were discussed in great detail with the patient. Risks include but are not limited to pain, bleeding, infection, stiffness/instability, impingement, graft re-rupture, meniscectomy versus repair, medical complications and risks of anesthesia. In addition, a discussion was had with the patient regarding anterior cruciate ligament graft options. This included the role of autograft versus allograft, and the associated donor site morbidity and rerupture rate with autograft versus risk of disease transmission/rerupture with allograft use. The patient expressed understanding and all questions were answered. \par \par We I have recommended MRI imaging right knee to confirm ACL deficiency, and we will consider ACL reconstruction left knee for symptomatic relief of symptoms.\par \par Follow up after MRI and presurgical planning.\par

## 2021-06-15 ENCOUNTER — OUTPATIENT (OUTPATIENT)
Dept: OUTPATIENT SERVICES | Facility: HOSPITAL | Age: 36
LOS: 1 days | End: 2021-06-15

## 2021-06-15 ENCOUNTER — APPOINTMENT (OUTPATIENT)
Dept: INTERNAL MEDICINE | Facility: CLINIC | Age: 36
End: 2021-06-15
Payer: COMMERCIAL

## 2021-06-15 VITALS — TEMPERATURE: 98.7 F

## 2021-06-15 VITALS
DIASTOLIC BLOOD PRESSURE: 60 MMHG | HEART RATE: 62 BPM | OXYGEN SATURATION: 96 % | WEIGHT: 183 LBS | HEIGHT: 65 IN | BODY MASS INDEX: 30.49 KG/M2 | SYSTOLIC BLOOD PRESSURE: 110 MMHG

## 2021-06-15 DIAGNOSIS — E66.3 OVERWEIGHT: ICD-10-CM

## 2021-06-15 PROCEDURE — ZZZZZ: CPT

## 2021-06-15 NOTE — ASSESSMENT
[FreeTextEntry1] : HCM-\par -Received Pfizer COVID-19 on 5/30/2021 and 2nd dose scheduled for 6/20/2021, encouraged\par -Flu vaccine not in season\par -Tdap last 8/2019 \par -Colorectal ca screen: No personal or family hx of colorectal ca, at average risk. Begin screen age 50\par -Prostate ca screen: No personal or family hx of prostate ca. Begin screen age 50\par -RTC in 6 months for FTF CPE. Pt states he will call to schedule appt\par \par *Assessment and Plan as noted above*.

## 2021-06-15 NOTE — REVIEW OF SYSTEMS
[Fever] : no fever [Chills] : no chills [Fatigue] : no fatigue [Night Sweats] : no night sweats [Chest Pain] : no chest pain [Palpitations] : no palpitations [Lower Ext Edema] : no lower extremity edema [Shortness Of Breath] : no shortness of breath [Wheezing] : no wheezing [Cough] : no cough [Dyspnea on Exertion] : not dyspnea on exertion [Abdominal Pain] : no abdominal pain [Nausea] : no nausea [Constipation] : no constipation [Diarrhea] : no diarrhea [Vomiting] : no vomiting [Joint Pain] : joint pain [Depression] : no depression [FreeTextEntry9] : ongoing knee pain

## 2021-06-15 NOTE — HISTORY OF PRESENT ILLNESS
[FreeTextEntry1] : "I have been trying to lose weight" [de-identified] : Pt is primarily Nepalese speaking and this author is a native speaker. Pt declines free interpretation services and would prefer this author to continue in native language.\par \par 35 y.o  M with preDM (A1c 5.7% on 12/2020), left knee ACL tear and lateral meniscus tear, chronic right knee ACL deficiency, hx mildly elevated ALT (8/2019), hx bilateral hydroceles (2019), hx testicular pain, overweight, seen last 6 months ago. In the interim, established care with orthopedics/sports med for left knee pain due to work injury. Plan is for pt to undergo knee surgery and awaiting workers' compensation. Pt authorizes [facility] to provide medical records to  when requested. Pt presents today with no acute complaints. Has been trying to lose weight since last visit. No longer eating a heavy dinner and has decreased food portions. Has been also attempting to eat less bread and rice,and drink less soda and diluting juices. Diet consists primarily of rice, beans, fish. Denies CP, SOB, CROOK, palpitations, lightheadedness, fever, chills, nausea, vomiting, diarrhea, unintentional weight loss, or recent travel.

## 2022-01-27 ENCOUNTER — APPOINTMENT (OUTPATIENT)
Dept: INTERNAL MEDICINE | Facility: CLINIC | Age: 37
End: 2022-01-27
Payer: MEDICAID

## 2022-01-27 ENCOUNTER — MED ADMIN CHARGE (OUTPATIENT)
Age: 37
End: 2022-01-27

## 2022-01-27 ENCOUNTER — OUTPATIENT (OUTPATIENT)
Dept: OUTPATIENT SERVICES | Facility: HOSPITAL | Age: 37
LOS: 1 days | End: 2022-01-27

## 2022-01-27 VITALS
TEMPERATURE: 97.1 F | HEART RATE: 68 BPM | OXYGEN SATURATION: 98 % | BODY MASS INDEX: 30.82 KG/M2 | WEIGHT: 185 LBS | DIASTOLIC BLOOD PRESSURE: 80 MMHG | SYSTOLIC BLOOD PRESSURE: 120 MMHG | HEIGHT: 65 IN

## 2022-01-27 DIAGNOSIS — M25.562 PAIN IN LEFT KNEE: ICD-10-CM

## 2022-01-27 DIAGNOSIS — Z23 ENCOUNTER FOR IMMUNIZATION: ICD-10-CM

## 2022-01-27 PROCEDURE — 99213 OFFICE O/P EST LOW 20 MIN: CPT

## 2022-01-27 RX ORDER — ACETAMINOPHEN 325 MG/1
TABLET, FILM COATED ORAL
Refills: 0 | Status: ACTIVE | COMMUNITY

## 2022-01-27 NOTE — ASSESSMENT
[FreeTextEntry1] : HCM-\par -Received Pfizer COVID-19 vaccine series on 5/30/2021 and 6/20/2021; overdue for booster but pt hesitant. Discussed risks vs benefits and strongly encouraged pt to obtain\par -Flu vaccine today \par -Tdap last 8/2019 \par -Colorectal ca screen: No personal or family hx of colorectal ca, at average risk. Begin screen age 45\par -Prostate ca screen: No personal or family hx of prostate ca. Begin discussion to screen at age 50\par -RTC at earliest convenience for FTF CPE\par \par *Assessment and Plan as noted above*

## 2022-01-27 NOTE — REVIEW OF SYSTEMS
[Fever] : no fever [Chills] : no chills [Night Sweats] : no night sweats [Chest Pain] : no chest pain [Palpitations] : no palpitations [Lower Ext Edema] : no lower extremity edema [Shortness Of Breath] : no shortness of breath [Wheezing] : no wheezing [Cough] : no cough [Dyspnea on Exertion] : not dyspnea on exertion [Abdominal Pain] : no abdominal pain [Nausea] : no nausea [Diarrhea] : no diarrhea [Vomiting] : no vomiting [FreeTextEntry9] : bilateral knee pain (see HPI)

## 2022-01-27 NOTE — HISTORY OF PRESENT ILLNESS
[FreeTextEntry1] : Left knee pain  [de-identified] : Pt is primarily Citizen of [place] speaking and this author is a native speaker. Pt declines free interpretation services and would prefer this author to continue in native language.\par \par 36 y.o  M with preDM (A1c 5.7% on 12/2020), left knee ACL tear and lateral meniscus tear, chronic right knee ACL deficiency, hx mildly elevated ALT (8/2019), hx bilateral hydrocele (2019), hx testicular pain, overweight, seen last 7 months ago, presenting today for an ortho referral, with c.o ongoing left knee pain. Pt sustained fall at work approx 6/2020 causing injury to his left knee. An MRI of the left knee on 12/2020 demonstrated partial thickness ACL tear, lateral meniscus tear of the anterior horn, and large osteochondral defect of the lateral condyle. Seen by ortho on 1/2021 and PT trial was recommended. However, PT did not attempt PT. Seen by sports medicine on 3/2021 for consideration of surgery but pt did not undergo further imaging and follow-up. Currently, pain at a level 5/10 in intensity. Describes pain as constant, mild, and associated with weakness, cracking at times. Pain worsens with exertion (i.e., prolonged walks). Has attempted wrapping knee and tylenol with relief. The knee pain interferes with pt’s ability to participate in sports and work. Pt also reports now with right knee pain for the past 8 months. Describes right knee pain as constant and possibly compensating for the left knee. Denies recent trauma, numbness/tingling, redness, swelling, CP, difficulty breathing, fever, chills, nausea, vomiting, diarrhea, or recent travel. \par \par ** Of note, pt previously on sliding scale. Now with [employer] **

## 2022-01-27 NOTE — PHYSICAL EXAM
[No Acute Distress] : no acute distress [Well Nourished] : well nourished [Well Developed] : well developed [No Respiratory Distress] : no respiratory distress  [No Accessory Muscle Use] : no accessory muscle use [Clear to Auscultation] : lungs were clear to auscultation bilaterally [Normal Rate] : normal rate  [Regular Rhythm] : with a regular rhythm [Normal S1, S2] : normal S1 and S2 [No Murmur] : no murmur heard [No Edema] : there was no peripheral edema [No Joint Swelling] : no joint swelling [Grossly Normal Strength/Tone] : grossly normal strength/tone [Normal Station and Gait] : the gait and station were normal [Normal] : motor strength was normal in all muscle groups [Normal Motor Tone] : the muscle tone was normal [Involuntary Movements] : no involuntary movements were seen [de-identified] : left knee medial tenderness to palpation

## 2022-02-01 ENCOUNTER — APPOINTMENT (OUTPATIENT)
Dept: ORTHOPEDIC SURGERY | Facility: CLINIC | Age: 37
End: 2022-02-01
Payer: MEDICAID

## 2022-02-01 VITALS
DIASTOLIC BLOOD PRESSURE: 81 MMHG | BODY MASS INDEX: 30.82 KG/M2 | HEART RATE: 68 BPM | SYSTOLIC BLOOD PRESSURE: 121 MMHG | WEIGHT: 185 LBS | HEIGHT: 65 IN

## 2022-02-01 PROCEDURE — 73562 X-RAY EXAM OF KNEE 3: CPT | Mod: RT

## 2022-02-01 PROCEDURE — 99213 OFFICE O/P EST LOW 20 MIN: CPT

## 2022-02-02 NOTE — HISTORY OF PRESENT ILLNESS
[de-identified] : 35 y/o male  presents today for follow up of left knee ACL injury.  Patient was referred by the orthopedic clinic.  He was instructed to obtain MRI of his right knee due to instability felt, but he was unable to process due to insurance issues.. He c/o pain and instability on today's visits. Taking Tylenol.  He reports that 2 years ago he was moving a heaving object up a ramp at work when it slide and knocked his LLE off the ramp causing him to land awkwardly off the side of the ramp. He states that he landed hard and his knee twisted.  Prior MRI  revealed a partial thickness ACL tear, a lateral meniscus tear of the anterior horn, and a large osteochondral defect of the lateral condyle.

## 2022-02-02 NOTE — DISCUSSION/SUMMARY
[de-identified] : 35 y/o male left knee ACL tear, lateral meniscus tear, chondral defect lateral femoral condyle; right knee ACL deficiency\par \par Presents for follow-up of left knee instability. He was unable to obtain the MRI images after the last visit as he did not have insurance.  He now has insurance and is looking to proceed with possible surgical correction of his left (and possibly) right knee.  We discussed the need for adequate follow-up if we are to perform surgical reconstruction of his ligamentously unstable knees, and need for an intense rehabiliation process that will involve months of dedicated rehabilitation.  \par \par We discussed the degree of injury to the left knee including the degree of chondral involvement as well as lateral meniscus involvement.  Potential candidate for chondral resurfacing procedures of as well as meniscal repair in addition to ACL reconstruction.  We also need to document degree of internal derangement to the right knee as suggested on clinical examination.\par \par Patient voiced understanding.\par \par Recommend : Updated imaging bilateral knees.  Consider surgical reconstruction left knee following updated imaging.\par \par Follow up after MRI and presurgical planning.

## 2022-02-02 NOTE — PHYSICAL EXAM
[de-identified] : Oriented to time, place, person\par Mood: Normal\par Affect: Normal\par Appearance: Healthy, well appearing, no acute distress.\par Gait: Normal\par Assistive Devices: None\par \par Left Knee Exam:\par \par Skin: Clean, dry, intact\par Inspection: No obvious malalignment, no masses, no swelling, no effusion\par Pulses: 2+ DP/PT pulses \par ROM: 0-140 degrees of flexion. No pain with deep knee flexion/extension.\par Tenderness: No MJLT. No LJLT. No pain over the patella facets. No pain to the quadriceps tendon. No pain to the patella tendon. No posterior knee tenderness.\par Stability: Stable to varus, valgus. IIB Lachman testing. + anterior drawer, negative posterior drawer.\par Strength: 5/5 Q/H/TA/GS/EHL, without atrophy\par Neuro: Intact to light touch throughout, DTRs normal\par Additional Tests: + Ron's test, Negative patellar grind test \par \par Right Knee Exam:\par \par Skin: Clean, dry, intact\par Inspection: No obvious malalignment, no masses, no swelling, no effusion\par Pulses: 2+ DP/PT pulses \par ROM: 0-135 degrees of flexion. No pain with deep knee flexion/extension.\par Tenderness: No MJLT. No LJLT. No pain over the patella facets. No pain to the quadriceps tendon. No pain to the patella tendon. No posterior knee tenderness.\par Stability: Stable to varus, valgus. IIB Lachman testing. + anterior drawer, negative posterior drawer.\par Strength: 5/5 Q/H/TA/GS/EHL, without atrophy\par Neuro: Intact to light touch throughout, DTRs normal\par Additional Tests: Negative Ron's test, Negative patellar grind test  [de-identified] : The following radiographs were ordered and read by me during this patients visit. I reviewed each radiograph in detail with the patient and discussed the findings as highlighted below. \par \par 4 views of the left knee were obtained today, 02/01/2022, that show no acute fracture or dislocation. There is no medial, no lateral and no patellofemoral degenerative changes seen. There is no significant malalignment. No significant other obvious osseous abnormality, otherwise unremarkable. \par \par 4 views of the right knee were obtained today, 02/01/2022, that show no acute fracture or dislocation. There is no medial, no lateral and no patellofemoral degenerative changes seen. There is no significant malalignment. No significant other obvious osseous abnormality, otherwise unremarkable. \par \par MRI of left knee dated 12.16.2020 shows moderate partial tearing of the lateral meniscus posterior horn with oblique partial radial tearing of the anterior horn.  Degenerative medial meniscus tear.  High-grade chronic tear anterior cruciate ligament.  Chondral injury lateral femoral condyle associated with grade II/III chondromalacia.

## 2022-02-02 NOTE — ADDENDUM
[FreeTextEntry1] : This note was written by Saadia Benjamin on 02/01/2022 acting solely as a scribe for Dr. Nolan De La Rosa.\par \par All medical record entries made by the Scribe were at my, Dr. Nolan De La Rosa, direction and personally dictated by me on 02/01/2022. I have personally reviewed the chart and agree that the record accurately reflects my personal performance of the history, physical exam, assessment and plan.

## 2022-02-21 ENCOUNTER — APPOINTMENT (OUTPATIENT)
Dept: MRI IMAGING | Facility: CLINIC | Age: 37
End: 2022-02-21
Payer: MEDICAID

## 2022-02-21 ENCOUNTER — OUTPATIENT (OUTPATIENT)
Dept: OUTPATIENT SERVICES | Facility: HOSPITAL | Age: 37
LOS: 1 days | End: 2022-02-21
Payer: MEDICAID

## 2022-02-21 DIAGNOSIS — S83.511D SPRAIN OF ANTERIOR CRUCIATE LIGAMENT OF RIGHT KNEE, SUBSEQUENT ENCOUNTER: ICD-10-CM

## 2022-02-21 PROCEDURE — 73721 MRI JNT OF LWR EXTRE W/O DYE: CPT | Mod: 26,LT,76

## 2022-02-21 PROCEDURE — 73721 MRI JNT OF LWR EXTRE W/O DYE: CPT

## 2022-03-01 ENCOUNTER — APPOINTMENT (OUTPATIENT)
Dept: ORTHOPEDIC SURGERY | Facility: CLINIC | Age: 37
End: 2022-03-01
Payer: MEDICAID

## 2022-03-01 VITALS — BODY MASS INDEX: 30.82 KG/M2 | WEIGHT: 185 LBS | HEIGHT: 65 IN

## 2022-03-01 DIAGNOSIS — S83.511D SPRAIN OF ANTERIOR CRUCIATE LIGAMENT OF RIGHT KNEE, SUBSEQUENT ENCOUNTER: ICD-10-CM

## 2022-03-01 PROCEDURE — 99214 OFFICE O/P EST MOD 30 MIN: CPT

## 2022-03-04 ENCOUNTER — APPOINTMENT (OUTPATIENT)
Dept: INTERNAL MEDICINE | Facility: CLINIC | Age: 37
End: 2022-03-04

## 2022-03-11 PROBLEM — S83.511D RUPTURE OF ANTERIOR CRUCIATE LIGAMENT OF RIGHT KNEE, SUBSEQUENT ENCOUNTER: Status: ACTIVE | Noted: 2021-03-02

## 2022-03-11 NOTE — HISTORY OF PRESENT ILLNESS
[de-identified] : 37 y/o male  presents today for follow up of left knee ACL injury.  He has obtained MRI and here for review of results.  Patient was referred by the orthopedic clinic. Symptoms have remained stable since last visit. He c/o mild  pain and occasional  instability on today's visits. Taking Tylenol.  He reports that 2 years ago he was moving a heaving object up a ramp at work when it slide and knocked his LLE off the ramp causing him to land awkwardly off the side of the ramp. He states that he landed hard and his knee twisted.

## 2022-03-11 NOTE — PHYSICAL EXAM
[de-identified] : Oriented to time, place, person\par Mood: Normal\par Affect: Normal\par Appearance: Healthy, well appearing, no acute distress.\par Gait: Normal\par Assistive Devices: None\par \par Left Knee Exam:\par \par Skin: Clean, dry, intact\par Inspection: No obvious malalignment, no masses, no swelling, no effusion\par Pulses: 2+ DP/PT pulses \par ROM: 0-140 degrees of flexion. No pain with deep knee flexion/extension.\par Tenderness: No MJLT. No LJLT. No pain over the patella facets. No pain to the quadriceps tendon. No pain to the patella tendon. No posterior knee tenderness.\par Stability: Stable to varus, valgus. IIB Lachman testing. + anterior drawer, negative posterior drawer.\par Strength: 5/5 Q/H/TA/GS/EHL, without atrophy\par Neuro: Intact to light touch throughout, DTRs normal\par Additional Tests: + Ron's test, Negative patellar grind test \par \par Right Knee Exam:\par \par Skin: Clean, dry, intact\par Inspection: No obvious malalignment, no masses, no swelling, no effusion\par Pulses: 2+ DP/PT pulses \par ROM: 0-135 degrees of flexion. No pain with deep knee flexion/extension.\par Tenderness: No MJLT. No LJLT. No pain over the patella facets. No pain to the quadriceps tendon. No pain to the patella tendon. No posterior knee tenderness.\par Stability: Stable to varus, valgus. IIB Lachman testing. + anterior drawer, negative posterior drawer.\par Strength: 5/5 Q/H/TA/GS/EHL, without atrophy\par Neuro: Intact to light touch throughout, DTRs normal\par Additional Tests: Negative Ron's test, Negative patellar grind test  [de-identified] : 4 views of the left knee were obtained 02/01/2022, that show no acute fracture or dislocation. There is no medial, no lateral and no patellofemoral degenerative changes seen. There is no significant malalignment. No significant other obvious osseous abnormality, otherwise unremarkable. \par \par 4 views of the right knee were obtained 02/01/2022, that show no acute fracture or dislocation. There is no medial, no lateral and no patellofemoral degenerative changes seen. There is no significant malalignment. No significant other obvious osseous abnormality, otherwise unremarkable. \par \par MRI of right knee dated 2.21.2022 shows full-thickness chronic rupture of ACL. Horizontal tear of lateral meniscus anterior horn/body with intrameniscal and parameniscal cyst (1.9 x 2.5 cm) formation. Focal deep chondral fissuring at posterolateral femoral condyle with patchy subchondral cystic change.\par \par MRI of left knee dated 2.21.22 shows high-grade chronic tear anterior cruciate ligament.  Chondral injury lateral femoral condyle associated with grade II/III chondromalacia. Lateral meniscus root tear.

## 2022-03-11 NOTE — DISCUSSION/SUMMARY
[de-identified] : 37 y/o male left knee ACL/LMT/chondral injury; right knee ACL tear\par \par Presents for follow-up of knee instability.  The patient has ACL injuries on bilateral knees, and a discussion was had with the patient regarding surgical management with likely bilateral ACL reconstructions.  Patient is looking to proceed with left knee surgery at this time, and a discussion was had regarding ACL reconstruction as well as evaluation of the root meniscus injury.  We can also further evaluate the chondral surface for possible debridement versus local osteochondral autograft transplantation.  Patient voiced understanding is agreeable for surgical intervention.\par \par All risks, benefits and alternatives to the procedure were discussed in great detail with the patient. Risks include but are not limited to pain, bleeding, infection, stiffness/instability, impingement, graft re-rupture, meniscectomy versus repair, chondral degeneration, medical complications and risks of anesthesia. In addition, a discussion was had with the patient regarding anterior cruciate ligament graft options. This included the role of autograft versus allograft, and the associated donor site morbidity and rerupture rate with autograft versus risk of disease transmission/rerupture with allograft use. The patient expressed understanding and all questions were answered.  \par \par A discussion was also had with the patient regarding additional precautions during surgery given the recent Covid-19 pandemic; specifically with regards to perioperative care, visitor policy, and pre-admission testing. The patient understands that current protocol requires either documented Covid-19 vaccination or a negative Covid-19 test no more than 48hrs prior to surgery. \par \par The patient is electing to proceed, and will have the patient scheduled accordingly.

## 2022-03-25 ENCOUNTER — OUTPATIENT (OUTPATIENT)
Dept: OUTPATIENT SERVICES | Facility: HOSPITAL | Age: 37
LOS: 1 days | End: 2022-03-25

## 2022-03-25 VITALS
RESPIRATION RATE: 17 BRPM | SYSTOLIC BLOOD PRESSURE: 112 MMHG | OXYGEN SATURATION: 98 % | HEIGHT: 66 IN | WEIGHT: 184.97 LBS | HEART RATE: 65 BPM | DIASTOLIC BLOOD PRESSURE: 76 MMHG | TEMPERATURE: 97 F

## 2022-03-25 DIAGNOSIS — S83.512A SPRAIN OF ANTERIOR CRUCIATE LIGAMENT OF LEFT KNEE, INITIAL ENCOUNTER: ICD-10-CM

## 2022-03-25 DIAGNOSIS — S83.242A OTHER TEAR OF MEDIAL MENISCUS, CURRENT INJURY, LEFT KNEE, INITIAL ENCOUNTER: ICD-10-CM

## 2022-03-25 LAB
HCT VFR BLD CALC: 44.4 % — SIGNIFICANT CHANGE UP (ref 39–50)
HGB BLD-MCNC: 15.2 G/DL — SIGNIFICANT CHANGE UP (ref 13–17)
MCHC RBC-ENTMCNC: 30.2 PG — SIGNIFICANT CHANGE UP (ref 27–34)
MCHC RBC-ENTMCNC: 34.2 GM/DL — SIGNIFICANT CHANGE UP (ref 32–36)
MCV RBC AUTO: 88.3 FL — SIGNIFICANT CHANGE UP (ref 80–100)
NRBC # BLD: 0 /100 WBCS — SIGNIFICANT CHANGE UP
NRBC # FLD: 0 K/UL — SIGNIFICANT CHANGE UP
PLATELET # BLD AUTO: 360 K/UL — SIGNIFICANT CHANGE UP (ref 150–400)
RBC # BLD: 5.03 M/UL — SIGNIFICANT CHANGE UP (ref 4.2–5.8)
RBC # FLD: 13.2 % — SIGNIFICANT CHANGE UP (ref 10.3–14.5)
WBC # BLD: 8.61 K/UL — SIGNIFICANT CHANGE UP (ref 3.8–10.5)
WBC # FLD AUTO: 8.61 K/UL — SIGNIFICANT CHANGE UP (ref 3.8–10.5)

## 2022-03-25 NOTE — H&P PST ADULT - HISTORY OF PRESENT ILLNESS
37 y/o male with no significant PMH presents to PST for pre op evaluation with C/O intermittent left knee pain S/P fall at work in 2019. Schedule for left knee arthroscopic osteochondral autologous transplantation lateral meniscus repair and anterior cruciate ligament reconstruction with bone tendon bone autograft

## 2022-03-25 NOTE — H&P PST ADULT - RS GEN PE MLT RESP DETAILS PC
breath sounds equal/good air movement/respirations non-labored/clear to auscultation bilaterally/no rales/no rhonchi/no wheezes breath sounds equal/good air movement/respirations non-labored/clear to auscultation bilaterally/no chest wall tenderness/no intercostal retractions/no rales/no rhonchi/no wheezes

## 2022-03-25 NOTE — H&P PST ADULT - PROBLEM SELECTOR PLAN 1
Schedule for left knee arthroscopic osteochondral autologous transplantation lateral meniscus repair and anterior cruciate ligament reconstruction with bone tendon bone autograft tentatively on 04/06/2022.   Pre op instructions, famotidine, chlorhexidine gluconate soap given and explained. Pt verbalized understanding. Pt verbalized understanding.   Pt instructed to contact surgeon's office regarding appt for Covid test pre op. Pt verbalized understanding.

## 2022-04-11 RX ORDER — ASPIRIN 325 MG/1
325 TABLET, FILM COATED ORAL DAILY
Qty: 28 | Refills: 0 | Status: ACTIVE | COMMUNITY
Start: 2022-04-11 | End: 1900-01-01

## 2022-04-11 RX ORDER — OXYCODONE AND ACETAMINOPHEN 5; 325 MG/1; MG/1
5-325 TABLET ORAL
Qty: 30 | Refills: 0 | Status: ACTIVE | COMMUNITY
Start: 2022-04-11 | End: 1900-01-01

## 2022-04-13 PROBLEM — Z78.9 OTHER SPECIFIED HEALTH STATUS: Chronic | Status: ACTIVE | Noted: 2022-03-25

## 2022-04-28 ENCOUNTER — TRANSCRIPTION ENCOUNTER (OUTPATIENT)
Age: 37
End: 2022-04-28

## 2022-04-28 VITALS
HEIGHT: 66 IN | SYSTOLIC BLOOD PRESSURE: 114 MMHG | TEMPERATURE: 97 F | WEIGHT: 184.97 LBS | RESPIRATION RATE: 18 BRPM | DIASTOLIC BLOOD PRESSURE: 72 MMHG | OXYGEN SATURATION: 99 % | HEART RATE: 60 BPM

## 2022-04-28 NOTE — ASU PREOPERATIVE ASSESSMENT, ADULT ([facility] ONLY) - PHONE #
PROGRESS NOTE    OVERNIGHT    SUBJECTIVE    PAST MEDICAL & SURGICAL HISTORY:  Hypothyroidism: newly diagnosed, started on T4 4/2013  Osteoarthritis  Colon cancer: &gt;30 years ago  Breast CA: &quot;many years ago&quot;  DVT (deep venous thrombosis): left leg not on A/C  HLD (hyperlipidemia)  Dementia  History of hysterectomy  Colostomy in place  Colon perforation  Ileostomy in place    HEALTH ISSUES - PROBLEM Dx:  Colostomy prolapse: Colostomy prolapse  Goals of care, counseling/discussion: Goals of care, counseling/discussion  Hydronephrosis with urinary obstruction due to ureteral calculus: Hydronephrosis with urinary obstruction due to ureteral calculus  Sepsis, due to unspecified organism: Sepsis, due to unspecified organism  Dementia without behavioral disturbance, unspecified dementia type: Dementia without behavioral disturbance, unspecified dementia type  HLD (hyperlipidemia): HLD (hyperlipidemia)  Colostomy in place: Colostomy in place  Dementia: Dementia  Osteoarthritis: Osteoarthritis  DVT (deep venous thrombosis): DVT (deep venous thrombosis)  Hypothyroidism: Hypothyroidism  Heart murmur: Heart murmur  Nephrolithiasis: Nephrolithiasis  Urinary tract infection with hematuria, site unspecified: Urinary tract infection with hematuria, site unspecified        FAMILY HISTORY:  No pertinent family history in first degree relatives    Allergies    No Known Allergies    Intolerances        MEDICATIONS  (STANDING):  amoxicillin  875 milliGRAM(s)/clavulanate 1Tablet(s) Oral two times a day  heparin  Injectable 5000Unit(s) SubCutaneous every 12 hours  levothyroxine 100MICROGram(s) Oral daily  lactobacillus acidophilus 1Tablet(s) Oral two times a day with meals  tamsulosin 0.4milliGRAM(s) Oral at bedtime  nystatin Cream 1Application(s) Topical two times a day  dextrose 5%. 1000milliLiter(s) IV Continuous <Continuous>  potassium chloride    Tablet ER 40milliEquivalent(s) Oral once    MEDICATIONS  (PRN):  acetaminophen   Tablet 650milliGRAM(s) Oral every 6 hours PRN For Temp greater than 38 C (100.4 F)  ondansetron Injectable 4milliGRAM(s) IV Push every 6 hours PRN Nausea and/or Vomiting  morphine  - Injectable 2milliGRAM(s) IV Push every 4 hours PRN Moderate Pain (4 - 6)      OBJECTIVE  PHYSICAL EXAM:  T(C): 37.6, Max: 37.7 (05-06 @ 13:49)  HR: 107 (94 - 111)  BP: 153/81 (120/66 - 168/81)  RR: 18 (17 - 20)  SpO2: 93% (91% - 97%)  Wt(kg): --  I&O's Summary    I & Os for current day (as of 07 May 2017 09:58)  =============================================  IN: 1420 ml / OUT: 350 ml / NET: 1070 ml      Appearance: NAD.   HEENT:   Moist oral mucosa. Conjunctiva clear b/l.   Lymphatic: No cervical lymphadenopathy  Cardiovascular: RRR with no m/r/g.  Respiratory: Lungs CTAB.  Gastrointestinal:  Soft, nontender. No rebound. No rigidity. 	  Skin: No rashes, No ecchymoses, No cyanosis.	  Neurologic: Non-focal. Moving all extremities. Following commands.  Extremities: No edema. No erythema. No calf tenderness. Malvin's negative.  Vascular: DP 2+ b/l.  	  LABS:                        11.4   16.4  )-----------( 167      ( 07 May 2017 07:31 )             36.8     05-07    151<H>  |  116<H>  |  12  ----------------------------<  127<H>  3.2<L>   |  22  |  1.00    Ca    8.7      07 May 2017 07:31    TPro  7.0  /  Alb  2.5<L>  /  TBili  0.7  /  DBili  x   /  AST  16  /  ALT  12  /  AlkPhos  62  05-06          RADIOLOGY & ADDITIONAL TESTS:    ASSESSMENT/PLAN: PROGRESS NOTE    OVERNIGHT  Dark fecal material in colostomy was reported ,seen by Dr Ruiz this am , expanding area of herniation lateral to ostomy was noticed and reduced   SUBJECTIVE  Patient looks comfortable and denies complains Confused poor menatation ,followed by pall care MD Recieves morphine prn pain Poor po intake is reported Hospice followup win the ,morning is pending ,may meet criteria for inpatient hospice   PAST MEDICAL & SURGICAL HISTORY:  Hypothyroidism: newly diagnosed, started on T4 4/2013  Osteoarthritis  Colon cancer: &gt;30 years ago  Breast CA: &quot;many years ago&quot;  DVT (deep venous thrombosis): left leg not on A/C  HLD (hyperlipidemia)  Dementia  History of hysterectomy  Colostomy in place  Colon perforation  Ileostomy in place    HEALTH ISSUES - PROBLEM Dx:  Colostomy prolapse: Colostomy prolapse  Goals of care, counseling/discussion: Goals of care, counseling/discussion  Hydronephrosis with urinary obstruction due to ureteral calculus: Hydronephrosis with urinary obstruction due to ureteral calculus  Sepsis, due to unspecified organism: Sepsis, due to unspecified organism  Dementia without behavioral disturbance, unspecified dementia type: Dementia without behavioral disturbance, unspecified dementia type  HLD (hyperlipidemia): HLD (hyperlipidemia)  Colostomy in place: Colostomy in place  Dementia: Dementia  Osteoarthritis: Osteoarthritis  DVT (deep venous thrombosis): DVT (deep venous thrombosis)  Hypothyroidism: Hypothyroidism  Heart murmur: Heart murmur  Nephrolithiasis: Nephrolithiasis  Urinary tract infection with hematuria, site unspecified: Urinary tract infection with hematuria, site unspecified        FAMILY HISTORY:  No pertinent family history in first degree relatives    Allergies    No Known Allergies    Intolerances        MEDICATIONS  (STANDING):  amoxicillin  875 milliGRAM(s)/clavulanate 1Tablet(s) Oral two times a day  heparin  Injectable 5000Unit(s) SubCutaneous every 12 hours  levothyroxine 100MICROGram(s) Oral daily  lactobacillus acidophilus 1Tablet(s) Oral two times a day with meals  tamsulosin 0.4milliGRAM(s) Oral at bedtime  nystatin Cream 1Application(s) Topical two times a day  dextrose 5%. 1000milliLiter(s) IV Continuous <Continuous>  potassium chloride    Tablet ER 40milliEquivalent(s) Oral once    MEDICATIONS  (PRN):  acetaminophen   Tablet 650milliGRAM(s) Oral every 6 hours PRN For Temp greater than 38 C (100.4 F)  ondansetron Injectable 4milliGRAM(s) IV Push every 6 hours PRN Nausea and/or Vomiting  morphine  - Injectable 2milliGRAM(s) IV Push every 4 hours PRN Moderate Pain (4 - 6)      OBJECTIVE  PHYSICAL EXAM:  T(C): 37.6, Max: 37.7 (05-06 @ 13:49)  HR: 107 (94 - 111)  BP: 153/81 (120/66 - 168/81)  RR: 18 (17 - 20)  SpO2: 93% (91% - 97%)  Wt(kg): --  I&O's Summary    I & Os for current day (as of 07 May 2017 09:58)  =============================================  IN: 1420 ml / OUT: 350 ml / NET: 1070 ml      Appearance: NAD. Confused poor menatation   HEENT:   Moist oral mucosa. Conjunctiva clear b/l.   Lymphatic: No cervical lymphadenopathy  Cardiovascular: RRR with no m/r/g.S1S2 present   Respiratory: Lungs decreased bs at bases   Gastrointestinal:  Soft, nontender. Colostomy -dark brown liquid stool . 	  Skin: No rashes, No ecchymoses, No cyanosis.	  Neurologic: Non-focal. Moving all extremities. Following commands.  Extremities: No edema. No erythema. No calf tenderness. Malvin's negative.  Vascular: DP 2+ b/l.  	  LABS:                        11.4   16.4  )-----------( 167      ( 07 May 2017 07:31 )             36.8     05-07    151<H>  |  116<H>  |  12  ----------------------------<  127<H>  3.2<L>   |  22  |  1.00    Ca    8.7      07 May 2017 07:31    TPro  7.0  /  Alb  2.5<L>  /  TBili  0.7  /  DBili  x   /  AST  16  /  ALT  12  /  AlkPhos  62  05-06          RADIOLOGY & ADDITIONAL TESTS:reviewed electronically    ASSESSMENT/PLAN: [phone]

## 2022-04-28 NOTE — ASU PREOPERATIVE ASSESSMENT, ADULT ([facility] ONLY) - FALL HARM RISK - UNIVERSAL INTERVENTIONS
Bed in lowest position, wheels locked, appropriate side rails in place/Call bell, personal items and telephone in reach/Instruct patient to call for assistance before getting out of bed or chair/Non-slip footwear when patient is out of bed/[place] to call system/Physically safe environment - no spills, clutter or unnecessary equipment/Purposeful Proactive Rounding/Room/bathroom lighting operational, light cord in reach

## 2022-04-29 ENCOUNTER — TRANSCRIPTION ENCOUNTER (OUTPATIENT)
Age: 37
End: 2022-04-29

## 2022-04-29 ENCOUNTER — OUTPATIENT (OUTPATIENT)
Dept: OUTPATIENT SERVICES | Facility: HOSPITAL | Age: 37
LOS: 1 days | Discharge: HOME | End: 2022-04-29
Payer: MEDICAID

## 2022-04-29 ENCOUNTER — APPOINTMENT (OUTPATIENT)
Dept: ORTHOPEDIC SURGERY | Facility: AMBULATORY SURGERY CENTER | Age: 37
End: 2022-04-29

## 2022-04-29 VITALS
SYSTOLIC BLOOD PRESSURE: 115 MMHG | HEART RATE: 80 BPM | DIASTOLIC BLOOD PRESSURE: 70 MMHG | TEMPERATURE: 97 F | RESPIRATION RATE: 16 BRPM | OXYGEN SATURATION: 97 %

## 2022-04-29 DIAGNOSIS — S83.242A OTHER TEAR OF MEDIAL MENISCUS, CURRENT INJURY, LEFT KNEE, INITIAL ENCOUNTER: ICD-10-CM

## 2022-04-29 PROCEDURE — 29882 ARTHRS KNE SRG MNISC RPR M/L: CPT | Mod: LT

## 2022-04-29 PROCEDURE — 29888 ARTHRS AID ACL RPR/AGMNTJ: CPT | Mod: LT

## 2022-04-29 DEVICE — SCREW FIX SOFT SILK 1.5  /  7MM X 25MM: Type: IMPLANTABLE DEVICE | Site: LEFT | Status: FUNCTIONAL

## 2022-04-29 DEVICE — SCREW CANUFLX SLK 1.5MM 7X20MM: Type: IMPLANTABLE DEVICE | Site: LEFT | Status: FUNCTIONAL

## 2022-04-29 DEVICE — ARTHREX SECONDARY FIXATION WITH PEEK SWIVELOCK ANCHOR 4.75 X 19.1MM: Type: IMPLANTABLE DEVICE | Site: LEFT | Status: FUNCTIONAL

## 2022-04-29 RX ORDER — OXYCODONE AND ACETAMINOPHEN 5; 325 MG/1; MG/1
5-325 TABLET ORAL EVERY 6 HOURS
Qty: 12 | Refills: 0 | Status: ACTIVE | COMMUNITY
Start: 2022-04-29 | End: 1900-01-01

## 2022-04-29 NOTE — ASU DISCHARGE PLAN (ADULT/PEDIATRIC) - CARE PROVIDER_API CALL
Nolan De La Rosa)  Orthopedics  [address]  [place] [address]  Phone: [phone]  Fax: [phone]  Follow Up Time:

## 2022-04-29 NOTE — ASU DISCHARGE PLAN (ADULT/PEDIATRIC) - PROCEDURE
Status Post Left knee arthroscopy, osteochondral autologous transplantation lateral meniscus repair, anterior cruciate ligament reconstruction with bone tendon bone autograft Left knee arthroscopy, lateral meniscus repair, anterior cruciate ligament reconstruction with bone tendon bone autograft

## 2022-04-29 NOTE — ASU DISCHARGE PLAN (ADULT/PEDIATRIC) - NS MD DC FALL RISK RISK
For information on Fall & Injury Prevention, visit: [email or web address] OR  [email or web address] OR  https://www.cdc.gov/steadi/patient.html

## 2022-05-12 ENCOUNTER — APPOINTMENT (OUTPATIENT)
Dept: ORTHOPEDIC SURGERY | Facility: CLINIC | Age: 37
End: 2022-05-12

## 2022-05-12 PROCEDURE — ZZZZZ: CPT

## 2022-05-12 NOTE — HISTORY OF PRESENT ILLNESS
[8] : the patient reports pain that is 8/10 in severity [Clean/Dry/Intact] : clean, dry and intact [Doing Well] : is doing well [Excellent Pain Control] : has excellent pain control [No Sign of Infection] : is showing no signs of infection [Sutures Removed] : sutures were removed [Steri-Strips Removed & Replaced] : steri-strips removed and replaced [Chills] : no chills [Fever] : no fever [Nausea] : no nausea [Vomiting] : no vomiting [Healed] : not healed [Erythema] : not erythematous [Discharge] : absent of discharge [Swelling] : not swollen [Dehiscence] : not dehisced [de-identified] : 36 year old male s/p Left knee arthroscopy with anterior cruciate ligament reconstruction using bone-tendon-bone autograft, left knee lateral posterior root meniscus repair 4/29/22\par  [de-identified] : 36 year old male s/p Left knee arthroscopy with anterior cruciate ligament reconstruction using bone-tendon-bone autograft, left knee lateral posterior root meniscus repair. He is doing well. He presents in Davian brace. He takes Percocet for pain.  [de-identified] : 36 year old male s/p Left knee arthroscopy with anterior cruciate ligament reconstruction using bone-tendon-bone autograft, left knee lateral posterior root meniscus repair.

## 2022-06-09 ENCOUNTER — APPOINTMENT (OUTPATIENT)
Dept: ORTHOPEDIC SURGERY | Facility: CLINIC | Age: 37
End: 2022-06-09
Payer: MEDICAID

## 2022-06-09 PROCEDURE — 99024 POSTOP FOLLOW-UP VISIT: CPT

## 2022-06-09 PROCEDURE — 73562 X-RAY EXAM OF KNEE 3: CPT | Mod: LT

## 2022-06-14 NOTE — HISTORY OF PRESENT ILLNESS
[0] : no pain reported [Clean/Dry/Intact] : clean, dry and intact [Doing Well] : is doing well [Excellent Pain Control] : has excellent pain control [No Sign of Infection] : is showing no signs of infection [Chills] : no chills [Fever] : no fever [Nausea] : no nausea [Vomiting] : no vomiting [Healed] : not healed [Erythema] : not erythematous [Discharge] : absent of discharge [Swelling] : not swollen [Dehiscence] : not dehisced [de-identified] : 36 year old male s/p left knee ACL (BTB auto), LM root repair 4/29/22\par  [de-identified] : 36 year old male s/p left knee ACL (BTB auto), LM root repair. Attending PT 2 x per week.  Ambulating via crutches and brace. He is not taking pain medication  [de-identified] : \par The following radiographs were ordered and read by me during this patients visit. I reviewed each radiograph in detail with the patient and discussed the findings as highlighted below. \par \par 3 views of the left knee were obtained today that show interval change of an anatomic anterior cruciate ligament reconstruction with bone tendon bone autograft. No evidence of hardware failure, otherwise unremarkable.. [de-identified] : 36 year old male s/p left knee ACL (BTB auto), LM root repair\par \par Postoperative imaging shows anatomic ACL reconstruction. Patient has good clinical stability on examination, and is doing well with postoperative rehabilitation at this time.\par  \par Recommendations:\par  \par 1. Continue PT per protocol; WBAT, closed chain exercises, proprioception exercise, begin elliptical when tolerated, hamstring stretching/strengthening\par 2. Brace: May discontinue brace once full extension and without extensor lag has been achieved\par 3. Meds: Discontinue  mg daily, OTC pain/Nsaids medication prn\par 4. Continue symptomatic Ice/elevate as needed\par 5. Restrictions: None begin weightbearing as tolerated.\par  \par Followup in 6 weeks.\par

## 2022-07-21 ENCOUNTER — APPOINTMENT (OUTPATIENT)
Dept: ORTHOPEDIC SURGERY | Facility: CLINIC | Age: 37
End: 2022-07-21

## 2022-07-21 NOTE — HISTORY OF PRESENT ILLNESS
[0] : no pain reported [Chills] : no chills [Fever] : no fever [Nausea] : no nausea [Vomiting] : no vomiting [Clean/Dry/Intact] : clean, dry and intact [Healed] : not healed [Erythema] : not erythematous [Discharge] : absent of discharge [Swelling] : not swollen [Dehiscence] : not dehisced [Doing Well] : is doing well [Excellent Pain Control] : has excellent pain control [No Sign of Infection] : is showing no signs of infection [de-identified] : 36 year old male s/p left knee ACL (BTB auto), LM root repair 4/29/22\par  [de-identified] : 36 year old male s/p left knee ACL (BTB auto), LM root repair. Attending PT 2 x per week.  Ambulating via crutches and brace. He is not taking pain medication  [de-identified] : 36 year old male s/p left knee ACL (BTB auto), LM root repair\par \par Postoperative imaging shows anatomic ACL reconstruction. Patient has good clinical stability on examination, and is doing well with postoperative rehabilitation at this time.\par  \par Recommendations:\par  \par 1. Continue PT per protocol; WBAT, closed chain exercises, proprioception exercise, begin elliptical when tolerated, hamstring stretching/strengthening\par 2. Brace: May discontinue brace once full extension and without extensor lag has been achieved\par 3. Meds: Discontinue  mg daily, OTC pain/Nsaids medication prn\par 4. Continue symptomatic Ice/elevate as needed\par 5. Restrictions: None begin weightbearing as tolerated.\par  \par Followup in 6 weeks.\par

## 2022-07-27 ENCOUNTER — APPOINTMENT (OUTPATIENT)
Dept: ORTHOPEDIC SURGERY | Facility: CLINIC | Age: 37
End: 2022-07-27

## 2022-07-27 PROCEDURE — 99024 POSTOP FOLLOW-UP VISIT: CPT

## 2022-08-01 NOTE — PHYSICAL EXAM
[de-identified] : Left knee Exam:\par \par Skin: Incision(s) Clean, dry, intact, no drainage, healed\par Inspection: Min swelling\par Pulses: 2+ DP/PT pulses \par ROM: 0-120\par Tenderness: Min\par Stability: Stable, 1A lachman\par Strength: 5/5 Q/H/TA/GS/EHL, quad atrophy\par Neuro: Intact to light touch throughout

## 2022-08-01 NOTE — ADDENDUM
[FreeTextEntry1] : This note was written by Saadia Benjamin on 07/27/2022 acting solely as a scribe for Dr. Nolan De La Rosa.\par \par All medical record entries made by the Scribe were at my, Dr. Nolan De La Rosa, direction and personally dictated by me on 07/27/2022. I have personally reviewed the chart and agree that the record accurately reflects my personal performance of the history, physical exam, assessment and plan.

## 2022-08-01 NOTE — REASON FOR VISIT
[Follow-Up Visit] : a follow-up visit for [Aftercare Following Surgery] : aftercare following surgery [FreeTextEntry2] : \par s/p left knee ACL (BTB auto), LM root repair 4/29/22

## 2022-08-01 NOTE — DISCUSSION/SUMMARY
[de-identified] : 36 year old male s/p left knee ACL (BTB auto), LM root repair.\par \par Patient is unable to perform physical therapy at this time given insurance status.  Discussion was held with the patient regarding continued hamstring/quad strengthening, advance closed chain exercises, proprioception exercise. Begin running/impact loading program. Progression of sport specific endurance/strengthening/sport specific drills. Goal of return to sport at 9-12mos following confirmation of strength, confidence and agility with completion of an RTP program.\par \par Recommendations: Home exercise program, knee sleeve, NSAIDs/ice as needed.  Continued activity restriction as discussed.  We will continue to consider contralateral knee reconstruction in the future.\par \par Followup in 3 mos.

## 2022-08-01 NOTE — HISTORY OF PRESENT ILLNESS
[de-identified] : 36 year old male s/p left knee ACL (BTB auto), LM root repair 4.29.22. PT stopped one month ago due to lack of authorization. Reports pain with knee flexion. He is not taking pain medication.

## 2022-08-09 ENCOUNTER — EMERGENCY (EMERGENCY)
Facility: HOSPITAL | Age: 37
LOS: 1 days | Discharge: HOME | End: 2022-08-09
Attending: EMERGENCY MEDICINE | Admitting: EMERGENCY MEDICINE
Payer: MEDICAID

## 2022-08-09 VITALS
SYSTOLIC BLOOD PRESSURE: 117 MMHG | RESPIRATION RATE: 16 BRPM | OXYGEN SATURATION: 95 % | WEIGHT: 184.09 LBS | TEMPERATURE: 99 F | HEART RATE: 65 BPM | HEIGHT: 66 IN | DIASTOLIC BLOOD PRESSURE: 76 MMHG

## 2022-08-09 VITALS
TEMPERATURE: 98 F | SYSTOLIC BLOOD PRESSURE: 120 MMHG | OXYGEN SATURATION: 96 % | DIASTOLIC BLOOD PRESSURE: 73 MMHG | RESPIRATION RATE: 17 BRPM | HEART RATE: 82 BPM

## 2022-08-09 PROCEDURE — 73562 X-RAY EXAM OF KNEE 3: CPT | Mod: 26,RT

## 2022-08-09 PROCEDURE — 99283 EMERGENCY DEPT VISIT LOW MDM: CPT

## 2022-08-09 PROCEDURE — 99283 EMERGENCY DEPT VISIT LOW MDM: CPT | Mod: 25

## 2022-08-09 PROCEDURE — 73562 X-RAY EXAM OF KNEE 3: CPT

## 2022-08-09 RX ORDER — ACETAMINOPHEN 500 MG
650 TABLET ORAL ONCE
Refills: 0 | Status: COMPLETED | OUTPATIENT
Start: 2022-08-09 | End: 2022-08-09

## 2022-08-09 RX ORDER — IBUPROFEN 200 MG
600 TABLET ORAL ONCE
Refills: 0 | Status: COMPLETED | OUTPATIENT
Start: 2022-08-09 | End: 2022-08-09

## 2022-08-09 RX ADMIN — Medication 650 MILLIGRAM(S): at 09:13

## 2022-08-09 RX ADMIN — Medication 600 MILLIGRAM(S): at 09:13

## 2022-08-09 NOTE — ED ADULT NURSE REASSESSMENT NOTE - NS ED NURSE REASSESS COMMENT FT1
0955:  patient given knee immobilizer and crutches.  MD discussing test results.  dispo pending.  celia johnson.

## 2022-08-09 NOTE — ED PROVIDER NOTE - NSFOLLOWUPINSTRUCTIONS_ED_ALL_ED_FT
1.  Take Motrin 400mg every 6 hours for 5 days with meal.  2.  Take Tylenol 650mg every 6 hours for 5 days.        Acute Knee Pain, Adult      Acute knee pain is sudden and may be caused by damage, swelling, or irritation of the muscles and tissues that support the knee. Pain may result from:  •A fall.      •An injury to the knee from twisting motions.      •A hit to the knee.      •Infection.      Acute knee pain may go away on its own with time and rest. If it does not, your health care provider may order tests to find the cause of the pain. These may include:  •Imaging tests, such as an X-ray, MRI, CT scan, or ultrasound.      •Joint aspiration. In this test, fluid is removed from the knee and evaluated.      •Arthroscopy. In this test, a lighted tube is inserted into the knee and an image is projected onto a TV screen.      •Biopsy. In this test, a sample of tissue is removed from the body and studied under a microscope.        Follow these instructions at home:      If you have a knee sleeve or brace:      •Wear the knee sleeve or brace as told by your health care provider. Remove it only as told by your health care provider.      •Loosen it if your toes tingle, become numb, or turn cold and blue.      •Keep it clean.    •If the knee sleeve or brace is not waterproof:  •Do not let it get wet.      •Cover it with a watertight covering when you take a bath or shower.        Activity     •Rest your knee.      • Do not do things that cause pain or make pain worse.      •Avoid high-impact activities or exercises, such as running, jumping rope, or doing jumping jacks.      •Work with a physical therapist to make a safe exercise program, as recommended by your health care provider. Do exercises as told by your physical therapist.        Managing pain, stiffness, and swelling    •If directed, put ice on the affected knee. To do this:  •If you have a removable knee sleeve or brace, remove it as told by your health care provider.      •Put ice in a plastic bag.      •Place a towel between your skin and the bag.      •Leave the ice on for 20 minutes, 2–3 times a day.      •Remove the ice if your skin turns bright red. This is very important. If you cannot feel pain, heat, or cold, you have a greater risk of damage to the area.        •If directed, use an elastic bandage to put pressure (compression) on your injured knee. This may control swelling, give support, and help with discomfort.      •Raise (elevate) your knee above the level of your heart while you are sitting or lying down.      •Sleep with a pillow under your knee.      General instructions     •Take over-the-counter and prescription medicines only as told by your health care provider.      • Do not use any products that contain nicotine or tobacco, such as cigarettes, e-cigarettes, and chewing tobacco. If you need help quitting, ask your health care provider.      •If you are overweight, work with your health care provider and a dietitian to set a weight-loss goal that is healthy and reasonable for you. Extra weight can put pressure on your knee.      •Pay attention to any changes in your symptoms.      •Keep all follow-up visits. This is important.        Contact a health care provider if:    •Your knee pain continues, changes, or gets worse.      •You have a fever along with knee pain.      •Your knee feels warm to the touch or is red.      •Your knee liam or locks up.        Get help right away if:    •Your knee swells, and the swelling becomes worse.      •You cannot move your knee.      •You have severe pain in your knee that cannot be managed with pain medicine.        Summary    •Acute knee pain can be caused by a fall, an injury, an infection, or damage, swelling, or irritation of the tissues that support your knee.      •Your health care provider may perform tests to find out the cause of the pain.      •Pay attention to any changes in your symptoms. Relieve your pain with rest, medicines, light activity, and the use of ice.      •Get help right away if your knee swells, you cannot move your knee, or you have severe pain that cannot be managed with medicine.      This information is not intended to replace advice given to you by your health care provider. Make sure you discuss any questions you have with your health care provider.      Document Revised: 06/02/2021 Document Reviewed: 06/02/2021    [employer] Patient Education © 2022 [employer]

## 2022-08-09 NOTE — ED PROVIDER NOTE - PATIENT PORTAL LINK FT
You can access the FollowMyHealth Patient Portal offered by [facility] by registering at the following website: [email or web address]. By joining [employer]’s FollowMyHealth portal, you will also be able to view your health information using other applications (apps) compatible with our system.

## 2022-08-09 NOTE — ED PROVIDER NOTE - CARE PROVIDER_API CALL
Tate Lim)  Kirk Chavez  Physicians  [address] [place] [address] [address]  [place] [address]  Phone: [phone]  Fax: [phone]  Follow Up Time: 1-3 Days

## 2022-08-09 NOTE — ED ADULT NURSE NOTE - SUICIDE SCREENING QUESTION 1
Flu vaccine today    Pt to return for fasting  Labs    appt with Dr. Heather Bolaños.     Continue present medications      Chapis Benson's SCREENING SCHEDULE   Tests on this list are recommended by your physician but may not be covered, or covered at this frequency Cancer Screening  Covered up to Age 76     Colonoscopy Screen   Covered every 10 years- more often if abnormal There are no preventive care reminders to display for this patient.  Update [email or web address] if applicable    Flex Sigmoidoscopy Screen  Covered Once after 65 No orders found for this or any previous visit. Please get once after your 65th birthday    Pneumococcal 23 (Pneumovax)  Covered Once after 65 No orders found for this or any previous visit.  Please get once after your 65th birthday    Shelia Barnes No

## 2022-08-09 NOTE — ED PROVIDER NOTE - OBJECTIVE STATEMENT
Right knee pain x 5 days.  Denies any injury. Right knee pain x 5 days.  Denies any injury.  No prior episodes. No other complaints.

## 2022-08-09 NOTE — ED PROVIDER NOTE - PROGRESS NOTE DETAILS
Patient informed that xray results are preliminary pending official report from the radiologist tomorrow.

## 2022-08-09 NOTE — ED ADULT NURSE REASSESSMENT NOTE - NS ED NURSE REASSESS COMMENT FT1
1100:  patient d/c.  no iv placed.  the patient was given all of his d/c papers and instructed to f/u with ortho.  at his request, he was given a CD of his xray to take to ortho.  He was given crutches and knee immobilizer and instructed how to use / remove, etc.  he ambulates with crutches very well, having had done this (with opposite knee).   the patient ambulated out of the ed in stable condition, no distress and with all belongings.  vitals recorded.  celia johnson.

## 2022-08-30 ENCOUNTER — APPOINTMENT (OUTPATIENT)
Dept: INTERNAL MEDICINE | Facility: CLINIC | Age: 37
End: 2022-08-30

## 2022-09-20 ENCOUNTER — APPOINTMENT (OUTPATIENT)
Dept: ORTHOPEDIC SURGERY | Facility: CLINIC | Age: 37
End: 2022-09-20

## 2022-09-20 DIAGNOSIS — S83.512D SPRAIN OF ANTERIOR CRUCIATE LIGAMENT OF LEFT KNEE, SUBSEQUENT ENCOUNTER: ICD-10-CM

## 2022-09-20 PROCEDURE — 99213 OFFICE O/P EST LOW 20 MIN: CPT

## 2022-09-21 PROBLEM — S83.512D RUPTURE OF ANTERIOR CRUCIATE LIGAMENT OF LEFT KNEE, SUBSEQUENT ENCOUNTER: Status: ACTIVE | Noted: 2021-03-02

## 2022-09-21 NOTE — PHYSICAL EXAM
[de-identified] : Oriented to time, place, person\par Mood: Normal\par Affect: Normal\par Appearance: Healthy, well appearing, no acute distress.\par Gait: Normal\par Assistive Devices: None \par \par Left knee Exam:\par \par Skin: Incision(s) Clean, dry, intact, no drainage, healed\par Inspection: Min swelling\par Pulses: 2+ DP/PT pulses \par ROM: 0-120\par Tenderness: Min\par Stability: Stable, 1A lachman\par Strength: 5/5 Q/H/TA/GS/EHL, quad atrophy\par Neuro: Intact to light touch throughout

## 2022-09-21 NOTE — DISCUSSION/SUMMARY
[de-identified] : 36 y/o male s/p left knee ACL (BTB auto), LM root repair.\par \par Patient is doing well under the guidance of physical therapy post-operatively. He has mild complaints of residual symptoms of stiffness. Recommendation is for continued PT for continuation of care.\par \par Recommendations: \par \par 1. Continue PT per protocol; Progress to terminal ROM as tolerated. Continue hamstring/quad strengthening, advance closed chain exercises, proprioception exercise. Begin running/impact loading program. Progression of sport specific endurance/strengthening/sport specific drills. Goal of return to sport at 9-12mos following confirmation of strength, confidence and agility with completion of an RTP program.\par 2. Brace: OTC knee sleeve as needed\par 3. Meds: PRN use NSAIDs/Tylenol\par 4. Continue symptomatic Ice/elevate as needed\par 5. Restrictions: None \par \par Followup in 3 mos.

## 2022-09-21 NOTE — ADDENDUM
[FreeTextEntry1] : This note was written by Saadia Benjamin on 09/20/2022 acting solely as a scribe for Dr. Nolan De La Rosa.\par \par All medical record entries made by the Scribe were at my, Dr. Nolan De La Rosa, direction and personally dictated by me on 09/20/2022. I have personally reviewed the chart and agree that the record accurately reflects my personal performance of the history, physical exam, assessment and plan.

## 2022-09-21 NOTE — REASON FOR VISIT
[Follow-Up Visit] : a follow-up visit for [FreeTextEntry2] : s/p left knee ACL (BTB auto), LM root repair

## 2022-09-21 NOTE — HISTORY OF PRESENT ILLNESS
[de-identified] : 36 year old male s/p left knee ACL (BTB auto), LM root repair 4.29.22. He has been going to PT after last visit.  C/o mild stiffness. Reports pain with knee flexion. He is not taking pain medication. Denies any instability to the left knee.  He is otherwise well and is happy with his current progress.

## 2022-12-28 NOTE — ED ADULT NURSE NOTE - ALCOHOL PRE SCREEN (AUDIT - C)
Pt states right arm has been hurting for about an hour.  Has been having intermittent numbness for over a year Statement Selected

## 2023-02-23 NOTE — ASSESSMENT
[FreeTextEntry1] : \par HCM-\par -Tdap last unknown, administer today \par -Unknown if ever obtained MMR vaccine. Obtain measles, mumps, rubella titers today \par -Colorectal ca screen: No personal or family hx of colorectal ca, at average risk. Begin screen age 50\par -STI screen offered, will obtain today. In a monogenous relationship with wife\par -Healthy eating and regular exercise encouraged\par -Will call with lab results \par -RTC annually or sooner prn \par \par *Assessment and Plan as noted above*  Post-op ICD instructions have been verbally explained and given to the patient. Patient was also given booklet and ID card. Patient expressed understanding and all questions were answered. A copy of the instruction is located in the patients chart   Patient is schedule for an appointment on 3/8/23 @ 10:40am  - No scrubbing the incision site   - No lotion, ointment, powder or direct sunlight to the incision site for 2 weeks   - No lifting more than 5lb or strenuous activity such as golfing, tennis or swimming for 6-8 weeks  - No swimming pool, [employer] for 6-8 weeks.   - You should carry your ID card for metal detectors   - Remove bandage after 24 hours  - Patient can shower 24 hours after procedure. Pat the area dry  - Keep Cellular phone 6 inches away  from the device  Patient was instructed to call [phone] if the following occurs:      - fever with temperature > 101F      - swelling, drainage or bleeding at the site incision  - Monitor electrolytes and replete K to 4 and Mg to 2. Creatinine stable.  Patient is scheduled for an appointment with EP on 3/8/23 @ 10:40am  - No scrubbing the incision site   - No lotion, ointment, powder or direct sunlight to the incision site for 2 weeks   - No lifting more than 5lb or strenuous activity such as golfing, tennis or swimming for 6-8 weeks  - No swimming pool, Jacuzzi for 6-8 weeks.   - You should carry your ID card for metal detectors   - Remove bandage after 24 hours  - Patient can shower 24 hours after procedure. Pat the area dry  - Keep Cellular phone 6 inches away  from the device  Patient was instructed to call [phone] if the following occurs:      - fever with temperature > 101F      - swelling, drainage or bleeding at the site incision

## 2023-06-01 ENCOUNTER — APPOINTMENT (OUTPATIENT)
Dept: ORTHOPEDIC SURGERY | Facility: CLINIC | Age: 38
End: 2023-06-01

## 2023-09-06 ENCOUNTER — EMERGENCY (EMERGENCY)
Facility: HOSPITAL | Age: 38
LOS: 1 days | Discharge: HOME | End: 2023-09-06
Attending: EMERGENCY MEDICINE | Admitting: EMERGENCY MEDICINE
Payer: SELF-PAY

## 2023-09-06 VITALS
SYSTOLIC BLOOD PRESSURE: 131 MMHG | DIASTOLIC BLOOD PRESSURE: 83 MMHG | RESPIRATION RATE: 16 BRPM | OXYGEN SATURATION: 95 % | HEART RATE: 83 BPM | WEIGHT: 186.07 LBS | HEIGHT: 65 IN | TEMPERATURE: 98 F

## 2023-09-06 PROCEDURE — 65220 REMOVE FOREIGN BODY FROM EYE: CPT | Mod: RT

## 2023-09-06 PROCEDURE — 99284 EMERGENCY DEPT VISIT MOD MDM: CPT | Mod: 25

## 2023-09-06 PROCEDURE — 99283 EMERGENCY DEPT VISIT LOW MDM: CPT | Mod: 25

## 2023-09-06 PROCEDURE — 65222 REMOVE FOREIGN BODY FROM EYE: CPT | Mod: RT

## 2023-09-06 RX ORDER — ERYTHROMYCIN BASE 5 MG/GRAM
1 OINTMENT (GRAM) OPHTHALMIC (EYE)
Qty: 1 | Refills: 0
Start: 2023-09-06 | End: 2023-09-12

## 2023-09-06 RX ORDER — FLUORESCEIN SODIUM 9 MG
2 STRIP OPHTHALMIC (EYE) ONCE
Refills: 0 | Status: COMPLETED | OUTPATIENT
Start: 2023-09-06 | End: 2023-09-06

## 2023-09-06 RX ADMIN — Medication 1 DROP(S): at 21:06

## 2023-09-06 RX ADMIN — Medication 2 APPLICATION(S): at 21:06

## 2023-09-06 NOTE — ED PROVIDER NOTE - NSFOLLOWUPINSTRUCTIONS_ED_ALL_ED_FT
Eye Foreign Body  A foreign body is an object on or in the eye that should not be there. This could be any object, such as:  A speck of dirt or dust.  A hair or eyelash.  A splinter.  A piece of metal, such as when a tiny piece of metal is thrown into the air while a person is working with certain tools.  If the object is on the outside of the eyeball, it can usually be washed out or taken out by your doctor. An object that is inside the eyeball needs emergency treatment right away.    What are the causes?  This condition is caused by an object hitting or entering the eye.    What are the signs or symptoms?  Common symptoms of this condition include:  Pain and irritation.  Feeling like something is in the eye.  Tears coming from the eye.  Redness.  Small "rust rings" around the object if it is metal.  Blurry vision.  If the object is inside the eyeball, it may cause:  A lot of pain.  Loss of vision right away or blurry vision.  A change in the shape of the black part of the eye (distortion of the pupil).  How is this treated?  Treatment for an object on the outside of the eyeball may include:  Having the object removed from your eye by your doctor. Your doctor may do this by washing the eye or using small instruments. If you have rust in your eye from a metal object, the doctor will also remove the rust.  Using eye drops that numb the eye to help with pain.  Using antibiotic drops or ointment if the object scratched your cornea. The cornea is the clear covering on the front of the eye.  Wearing a bandage (eye shield) over your eye.  If you have a foreign body inside your eyeball, you will need surgery right away.    You may need to see an eye specialist (ophthalmologist) for further treatment.    Follow these instructions at home:    Medicines    Take over-the-counter and prescription medicines only as told by your doctor. Use eye drops or ointment as told.  If you were prescribed antibiotic drops or ointment, use it as told by your doctor. Do not stop using it even if you start to feel better.  General instructions    If you have a bandage on your eye:  Wear it as told. Follow instructions from your doctor about when to take it off.  Do not drive or use machinery while wearing the bandage.  If you do not have a bandage on your eye:  Keep your eye closed as much as possible.  Do not rub your eye.  Do not wear contact lenses until your eye feels normal, or as told by your doctor.  Wear dark glasses in bright light.  If you are doing activities with a high risk of eye injury, wear protective eye covering. These activities include using high-speed tools.  Keep all follow-up visits.  Contact a doctor if:  You have more pain in your eye.  You have problems with your eye bandage.  You have abnormal fluid (discharge) coming from your eye.  Get help right away if:  Your ability to see (vision) gets worse.  You have more redness and swelling in or around your eye.  Summary  A foreign body is an object on or in the eye that should not be there.  An object on the outside of the eyeball can usually be washed out or taken out by your doctor. An object inside the eyeball is an emergency.  If you have a bandage on your eye (eye shield), do not drive while wearing it.  If you have more pain in your eye, contact your doctor.  This information is not intended to replace advice given to you by your health care provider. Make sure you discuss any questions you have with your health care provider.

## 2023-09-06 NOTE — ED PROVIDER NOTE - PATIENT PORTAL LINK FT
You can access the FollowMyHealth Patient Portal offered by [facility] by registering at the following website: [email or web address]. By joining [facility]’s FollowMyHealth portal, you will also be able to view your health information using other applications (apps) compatible with our system.

## 2023-09-06 NOTE — ED ADULT NURSE NOTE - OBJECTIVE STATEMENT
Pt received in 13B from triage, AOx3 and breathing unlabored on room air. Pt endorses 8/10 pain in right s/p working with cement and foreign body lodging into eye. PERRLA noted B/L. redness noted to right sclera, no drainage. Pt states he feels scratching under top eyelid. Pt brought to eyewash station. Pending Md renteria, will continue to monitor.

## 2023-09-06 NOTE — ED PROVIDER NOTE - NSFOLLOWUPCLINICS_GEN_ALL_ED_FT
[facility]  Ophthalmology  [address] [place] [address] [id number]  [place] [address]  Phone: [phone]  Fax:   Follow Up Time: 1-3 Days

## 2023-09-06 NOTE — ED ADULT TRIAGE NOTE - CHIEF COMPLAINT QUOTE
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  no visual changes but c/o pain

## 2023-09-06 NOTE — ED PROVIDER NOTE - CLINICAL SUMMARY MEDICAL DECISION MAKING FREE TEXT BOX
Patient 38-year-old  male with questionable cement type of foreign body into left eye 24 hours ago while at work presenting here to the emergency department for evaluation complaining of mild pain irritation and redness right eye.  No altered vision.  No nausea no vomiting.  No headache.  This case will require extensive evaluation as well as foreign body removal from right cornea.

## 2023-09-06 NOTE — ED PROVIDER NOTE - ATTENDING APP SHARED VISIT CONTRIBUTION OF CARE
Exam revealed overweight  male in no acute distress.  HEENT normocephalic atraumatic pupils are equal round reactive to light and accommodation extraocular movements intact right eye is injected with a 1-2 mm circular foreign body seen at approximately 3:00 right corneal periphery.  I agree with plan and management outlined by PA.

## 2023-09-06 NOTE — ED PROVIDER NOTE - OBJECTIVE STATEMENT
38-year-old male with no past medical history presents to the ED complaining of right thigh foreign body sensation, irritation, sensitivity to light and increased tearing since getting concrete splashed into his eye while at work yesterday afternoon.  No glasses or contacts.  Up-to-date with tetanus. 38-year-old male with no past medical history presents to the ED complaining of right eye foreign body sensation, irritation, sensitivity to light and increased tearing after possible foreign body to eye at work yesterday afternoon.  No glasses or contacts.  Up-to-date with tetanus. + mild irritation to eye. no visual changes. no headache. no dizziness.

## 2023-09-06 NOTE — ED PROCEDURE NOTE - PROCEDURE ADDITIONAL DETAILS
Under Woods lamp followed by direct ophthalmoscopic exam a 1 to 2 mm foreign body was removed with a number 25-gauge needle from the periphery of the right cornea at approximately 3:00.  Patient tolerated the procedure well.  Small defect remained.  Antibiotic eyedrops prescribed.  Ophthalmology referral given and to be seen by them within 24 to 48 hours.

## 2023-09-06 NOTE — ED PROVIDER NOTE - EYE, RIGHT
+ small metallic appearing FB at 3 o'clock right cornea/clear/pupils equal, round, and reactive to light

## 2023-09-13 ENCOUNTER — APPOINTMENT (OUTPATIENT)
Dept: OPHTHALMOLOGY | Facility: CLINIC | Age: 38
End: 2023-09-13
Payer: MEDICAID

## 2023-09-13 ENCOUNTER — NON-APPOINTMENT (OUTPATIENT)
Age: 38
End: 2023-09-13

## 2023-09-13 PROCEDURE — 92004 COMPRE OPH EXAM NEW PT 1/>: CPT

## 2023-09-15 ENCOUNTER — APPOINTMENT (OUTPATIENT)
Dept: OPHTHALMOLOGY | Facility: CLINIC | Age: 38
End: 2023-09-15
Payer: MEDICAID

## 2023-09-15 ENCOUNTER — NON-APPOINTMENT (OUTPATIENT)
Age: 38
End: 2023-09-15

## 2023-09-15 PROCEDURE — 92012 INTRM OPH EXAM EST PATIENT: CPT

## 2023-09-21 ENCOUNTER — NON-APPOINTMENT (OUTPATIENT)
Age: 38
End: 2023-09-21

## 2023-09-21 ENCOUNTER — APPOINTMENT (OUTPATIENT)
Dept: OPHTHALMOLOGY | Facility: CLINIC | Age: 38
End: 2023-09-21
Payer: MEDICAID

## 2023-09-21 PROCEDURE — 92012 INTRM OPH EXAM EST PATIENT: CPT

## 2024-12-12 NOTE — ED PROVIDER NOTE - ENMT, MLM
[2+] : left foot dorsalis pedis 2+ Airway patent, Nasal mucosa clear. Mouth with normal mucosa. Throat has no vesicles, no oropharyngeal exudates and uvula is midline. No parotid gland swelling or tenderness. [Skin Turgor] : normal skin turgor [Skin Lesions] : no skin lesions [Sensation] : the sensory exam was normal to light touch and pinprick [No Focal Deficits] : no focal deficits [Deep Tendon Reflexes (DTR)] : deep tendon reflexes were 2+ and symmetric [Motor Exam] : the motor exam was normal [General Appearance - Alert] : alert [Oriented To Time, Place, And Person] : oriented to person, place, and time [Ankle Swelling (On Exam)] : not present [Varicose Veins Of Lower Extremities] : not present [] : not present [Delayed in the Right Toes] : capillary refills normal in right toes [Delayed in the Left Toes] : capillary refills normal in the left toes [de-identified] : There is collapse of the medial, longitudinal arch of the left foot.  Patient has a posterior tibial tendon dysfunction and progressive collapsing foot deformity on the left that is semi-rigid with abduction of the forefoot and forefoot varus as well as heel valgus.   No crepitation on ROM.  There is sinus tarsi pain.  She is wearing a brace.  Patient wants to consider surgery for this in the future and when she decides, she will come back for evaluation and x-rays.   [Foot Ulcer] : no foot ulcer [Skin Induration] : no skin induration [FreeTextEntry1] : Paronychia, posterior, medial nailbed, right hallux nail.

## (undated) DEVICE — SHAVER BLADE S&N FULL RADIUS BONECUTTER 5.5MM (ORANGE)

## (undated) DEVICE — WARMING BLANKET UPPER ADULT

## (undated) DEVICE — TUBING DEPUY MITEK FMS INFLOW

## (undated) DEVICE — ARTHREX KIT ACL TRANSTIBIAL WITHOUT SAW BLADE

## (undated) DEVICE — TOURNIQUET CUFF 34" DUAL PORT W PLC

## (undated) DEVICE — DRSG STERISTRIPS 0.25 X 3"

## (undated) DEVICE — PACK MINOR NO DRAPE

## (undated) DEVICE — SUT VICRYL 3-0 18" SH (POP-OFF)

## (undated) DEVICE — TOURNIQUET ESMARK 6"

## (undated) DEVICE — SOL IRR BAG NS 0.9% 3000ML

## (undated) DEVICE — PLUG BONE CANN 7-12MM

## (undated) DEVICE — VENODYNE/SCD SLEEVE CALF MEDIUM

## (undated) DEVICE — TOURNIQUET CUFF 24" DUAL PORT SINGLE BLADDER LUER LOCK  (BLACK)

## (undated) DEVICE — SUT NYLON 3-0 18" PS-2

## (undated) DEVICE — TUBING DEPUY MITEK FMS OUTFLOW

## (undated) DEVICE — DRAPE 3/4 SHEET 52X76"

## (undated) DEVICE — CAM-ESU OLYMPUS 1501307: Type: DURABLE MEDICAL EQUIPMENT

## (undated) DEVICE — POSITIONER STRAP ARMBOARD VELCRO TS-30

## (undated) DEVICE — SAW BLADE MICROAIRE SAGITTAL 9.4MMX25.4MMX0.6MM

## (undated) DEVICE — SUT VICRYL 0 18" CT-1 (POP-OFF)

## (undated) DEVICE — SUT VICRYL 2-0 27" CT-2 UNDYED

## (undated) DEVICE — NDL HYPO SAFE 21G X 1.5" (GREEN)

## (undated) DEVICE — GLV 8 PROTEXIS (CREAM) NEU-THERA

## (undated) DEVICE — BLADE SURGICAL #15 CARBON

## (undated) DEVICE — SUT MONOCRYL 4-0 18" PS-2

## (undated) DEVICE — SUT ETHIBOND 2 30" V37

## (undated) DEVICE — CANNULA S&N CLEAR-TRAC SCREW 7MM

## (undated) DEVICE — DEPUY ACL GRAFT KNIFE 9MM

## (undated) DEVICE — DEPUY ACL GRAFT KNIFE 10MM

## (undated) DEVICE — NDL SPINAL 18G X 3.5" (PINK)

## (undated) DEVICE — SUT FIBERWIRE LOOP 2 STRAIGHT NDL 15"

## (undated) DEVICE — PACK KNEE ARTHROSCOPY

## (undated) DEVICE — ARTHREX SCORPION NEEDLE KNEE

## (undated) DEVICE — SHAVER BLADE S&N FULL RADIUS 3.5MM STRAIGHT (BEIGE)